# Patient Record
Sex: MALE | Race: WHITE | NOT HISPANIC OR LATINO | Employment: OTHER | URBAN - NONMETROPOLITAN AREA
[De-identification: names, ages, dates, MRNs, and addresses within clinical notes are randomized per-mention and may not be internally consistent; named-entity substitution may affect disease eponyms.]

---

## 2023-03-22 DIAGNOSIS — I10 ESSENTIAL (PRIMARY) HYPERTENSION: ICD-10-CM

## 2023-03-22 DIAGNOSIS — E78.00 PURE HYPERCHOLESTEROLEMIA, UNSPECIFIED: ICD-10-CM

## 2023-03-22 RX ORDER — SIMVASTATIN 40 MG/1
TABLET, FILM COATED ORAL
Qty: 90 TABLET | Refills: 1 | Status: SHIPPED | OUTPATIENT
Start: 2023-03-22

## 2023-03-22 RX ORDER — LISINOPRIL AND HYDROCHLOROTHIAZIDE 10; 12.5 MG/1; MG/1
TABLET ORAL
Qty: 90 TABLET | Refills: 1 | Status: SHIPPED | OUTPATIENT
Start: 2023-03-22 | End: 2023-08-02 | Stop reason: SDUPTHER

## 2023-04-04 PROBLEM — F33.1 MODERATE EPISODE OF RECURRENT MAJOR DEPRESSIVE DISORDER (MULTI): Status: ACTIVE | Noted: 2023-04-04

## 2023-04-04 PROBLEM — N52.9 MALE ERECTILE DISORDER OF ORGANIC ORIGIN: Status: ACTIVE | Noted: 2023-04-04

## 2023-04-04 PROBLEM — E66.01 CLASS 2 SEVERE OBESITY DUE TO EXCESS CALORIES WITH SERIOUS COMORBIDITY AND BODY MASS INDEX (BMI) OF 36.0 TO 36.9 IN ADULT (MULTI): Status: ACTIVE | Noted: 2023-04-04

## 2023-04-04 PROBLEM — I10 HYPERTENSION: Status: ACTIVE | Noted: 2023-04-04

## 2023-04-04 PROBLEM — N40.0 ENLARGED PROSTATE WITHOUT LOWER URINARY TRACT SYMPTOMS (LUTS): Status: ACTIVE | Noted: 2023-04-04

## 2023-04-04 PROBLEM — E66.812 CLASS 2 SEVERE OBESITY DUE TO EXCESS CALORIES WITH SERIOUS COMORBIDITY AND BODY MASS INDEX (BMI) OF 36.0 TO 36.9 IN ADULT: Status: ACTIVE | Noted: 2023-04-04

## 2023-04-04 PROBLEM — J01.80 OTHER ACUTE SINUSITIS: Status: ACTIVE | Noted: 2023-04-04

## 2023-04-04 PROBLEM — G47.33 OBSTRUCTIVE SLEEP APNEA: Status: ACTIVE | Noted: 2023-04-04

## 2023-04-04 PROBLEM — R53.83 FATIGUE: Status: ACTIVE | Noted: 2023-04-04

## 2023-04-04 PROBLEM — E78.00 HYPERCHOLESTEROLEMIA: Status: ACTIVE | Noted: 2023-04-04

## 2023-04-04 PROBLEM — G56.01 CARPAL TUNNEL SYNDROME OF RIGHT WRIST: Status: ACTIVE | Noted: 2023-04-04

## 2023-04-04 PROBLEM — R20.0 HAND NUMBNESS: Status: ACTIVE | Noted: 2023-04-04

## 2023-04-04 PROBLEM — M17.0 PRIMARY OSTEOARTHRITIS OF BOTH KNEES: Status: ACTIVE | Noted: 2023-04-04

## 2023-04-04 RX ORDER — MULTIVITAMIN
1 TABLET ORAL DAILY
COMMUNITY
End: 2023-04-05 | Stop reason: ALTCHOICE

## 2023-04-04 RX ORDER — IBUPROFEN 100 MG/5ML
1 SUSPENSION, ORAL (FINAL DOSE FORM) ORAL DAILY
COMMUNITY
End: 2023-04-05 | Stop reason: ALTCHOICE

## 2023-04-04 RX ORDER — FLUVOXAMINE MALEATE 100 MG/1
1 CAPSULE, EXTENDED RELEASE ORAL DAILY
COMMUNITY
Start: 2023-03-03 | End: 2023-04-05

## 2023-04-04 RX ORDER — SILDENAFIL 100 MG/1
1 TABLET, FILM COATED ORAL
COMMUNITY
Start: 2021-04-05 | End: 2023-05-03

## 2023-04-04 RX ORDER — ASPIRIN 81 MG/1
TABLET ORAL
COMMUNITY

## 2023-04-04 RX ORDER — HYDROXYZINE HYDROCHLORIDE 10 MG/1
1 TABLET, FILM COATED ORAL 4 TIMES DAILY PRN
COMMUNITY
End: 2023-04-24

## 2023-04-05 ENCOUNTER — OFFICE VISIT (OUTPATIENT)
Dept: PRIMARY CARE | Facility: CLINIC | Age: 73
End: 2023-04-05
Payer: MEDICARE

## 2023-04-05 VITALS
SYSTOLIC BLOOD PRESSURE: 142 MMHG | WEIGHT: 246 LBS | HEART RATE: 83 BPM | OXYGEN SATURATION: 97 % | HEIGHT: 71 IN | BODY MASS INDEX: 34.44 KG/M2 | DIASTOLIC BLOOD PRESSURE: 88 MMHG

## 2023-04-05 DIAGNOSIS — F33.1 MODERATE EPISODE OF RECURRENT MAJOR DEPRESSIVE DISORDER (MULTI): ICD-10-CM

## 2023-04-05 PROBLEM — E66.09 CLASS 1 OBESITY DUE TO EXCESS CALORIES WITHOUT SERIOUS COMORBIDITY WITH BODY MASS INDEX (BMI) OF 34.0 TO 34.9 IN ADULT: Status: ACTIVE | Noted: 2023-04-04

## 2023-04-05 PROBLEM — E66.811 CLASS 1 OBESITY DUE TO EXCESS CALORIES WITHOUT SERIOUS COMORBIDITY WITH BODY MASS INDEX (BMI) OF 34.0 TO 34.9 IN ADULT: Status: ACTIVE | Noted: 2023-04-04

## 2023-04-05 PROBLEM — E66.01 CLASS 2 SEVERE OBESITY DUE TO EXCESS CALORIES WITH SERIOUS COMORBIDITY AND BODY MASS INDEX (BMI) OF 36.0 TO 36.9 IN ADULT (MULTI): Status: RESOLVED | Noted: 2023-04-04 | Resolved: 2023-04-05

## 2023-04-05 PROBLEM — E66.812 CLASS 2 SEVERE OBESITY DUE TO EXCESS CALORIES WITH SERIOUS COMORBIDITY AND BODY MASS INDEX (BMI) OF 36.0 TO 36.9 IN ADULT: Status: RESOLVED | Noted: 2023-04-04 | Resolved: 2023-04-05

## 2023-04-05 PROCEDURE — 99213 OFFICE O/P EST LOW 20 MIN: CPT | Performed by: FAMILY MEDICINE

## 2023-04-05 PROCEDURE — 3077F SYST BP >= 140 MM HG: CPT | Performed by: FAMILY MEDICINE

## 2023-04-05 PROCEDURE — 3079F DIAST BP 80-89 MM HG: CPT | Performed by: FAMILY MEDICINE

## 2023-04-05 PROCEDURE — 1036F TOBACCO NON-USER: CPT | Performed by: FAMILY MEDICINE

## 2023-04-05 PROCEDURE — 1159F MED LIST DOCD IN RCRD: CPT | Performed by: FAMILY MEDICINE

## 2023-04-05 PROCEDURE — 1160F RVW MEDS BY RX/DR IN RCRD: CPT | Performed by: FAMILY MEDICINE

## 2023-04-05 RX ORDER — VENLAFAXINE HYDROCHLORIDE 75 MG/1
75 CAPSULE, EXTENDED RELEASE ORAL DAILY
Qty: 30 CAPSULE | Refills: 2 | Status: SHIPPED | OUTPATIENT
Start: 2023-04-05 | End: 2023-04-10

## 2023-04-05 ASSESSMENT — ENCOUNTER SYMPTOMS
OCCASIONAL FEELINGS OF UNSTEADINESS: 0
LOSS OF SENSATION IN FEET: 0
DEPRESSION: 0

## 2023-04-05 NOTE — PROGRESS NOTES
Subjective   Patient ID: Gerardo Santiago is a 73 y.o. male who presents for Follow-up (Follow up ).  HPI  Mood is not doing well still  Has not sleeping in bed- feels boxed in  Pace around the house as much  Not eating well  Everything bothers him  Does not do much of anything  Some feels of hopelessness and worthless  + cries and sadness  + guilt feeling  Concentration poor  No HI  Some SI- but will not do anything because of kids and grandkids    Still having ED    Current Outpatient Medications:     aspirin 81 mg EC tablet, Take by mouth., Disp: , Rfl:     hydrOXYzine HCL (Atarax) 10 mg tablet, Take 1 tablet (10 mg) by mouth 4 times a day as needed., Disp: , Rfl:     lisinopriL-hydrochlorothiazide 10-12.5 mg tablet, TAKE 1 TABLET BY MOUTH EVERY DAY, Disp: 90 tablet, Rfl: 1    sildenafil (Viagra) 100 mg tablet, Take 1 tablet (100 mg) by mouth. DAILY 1 HOUR BEFORE NEEDED, Disp: , Rfl:     simvastatin (Zocor) 40 mg tablet, TAKE 1 TABLET BY MOUTH EVERY DAY, Disp: 90 tablet, Rfl: 1    venlafaxine XR (Effexor-XR) 75 mg 24 hr capsule, Take 1 capsule (75 mg) by mouth once daily. Take with food., Disp: 30 capsule, Rfl: 2   Past Surgical History:   Procedure Laterality Date    TONSILLECTOMY  2014    Tonsillectomy With Adenoidectomy      Past Medical History:   Diagnosis Date    Essential (primary) hypertension 10/10/2022    Hypertension    Hypersomnia, unspecified 2014    Hypersomnia, organic    Personal history of other specified conditions 2016    History of fatigue    Pure hypercholesterolemia, unspecified 10/10/2022    Hypercholesterolemia     Social History     Tobacco Use    Smoking status: Former     Types: Cigarettes     Quit date:      Years since quittin.2    Smokeless tobacco: Never   Substance Use Topics    Alcohol use: Yes     Alcohol/week: 12.0 standard drinks of alcohol     Types: 12 Cans of beer per week    Drug use: Never      No family history on file.   Review of  "Systems    Objective   /88 Comment: Did not take meds today  Pulse 83   Ht 1.803 m (5' 11\")   Wt 112 kg (246 lb)   SpO2 97%   BMI 34.31 kg/m²    Physical Exam  Vitals and nursing note reviewed.   Constitutional:       Appearance: Normal appearance.   HENT:      Head: Normocephalic and atraumatic.   Cardiovascular:      Rate and Rhythm: Normal rate and regular rhythm.      Pulses: Normal pulses.      Heart sounds: Normal heart sounds.   Pulmonary:      Effort: Pulmonary effort is normal.      Breath sounds: Normal breath sounds.   Musculoskeletal:      Cervical back: Normal range of motion and neck supple.   Skin:     Capillary Refill: Capillary refill takes less than 2 seconds.   Neurological:      General: No focal deficit present.      Mental Status: He is alert and oriented to person, place, and time.   Psychiatric:         Mood and Affect: Mood is depressed. Affect is flat and tearful.         Behavior: Behavior normal.         Assessment/Plan   Problem List Items Addressed This Visit       Moderate episode of recurrent major depressive disorder (CMS/HCC)    Relevant Medications    venlafaxine XR (Effexor-XR) 75 mg 24 hr capsule   CV exercise  Avoid alcohol  discontinue fluvoxamine  F/U 1 month    Patient understands and agrees with treatment plan    Stefan Kaur, DO   "

## 2023-04-10 ENCOUNTER — TELEPHONE (OUTPATIENT)
Dept: PRIMARY CARE | Facility: CLINIC | Age: 73
End: 2023-04-10
Payer: MEDICARE

## 2023-04-10 DIAGNOSIS — F33.1 MODERATE EPISODE OF RECURRENT MAJOR DEPRESSIVE DISORDER (MULTI): Primary | ICD-10-CM

## 2023-04-10 RX ORDER — VENLAFAXINE HYDROCHLORIDE 150 MG/1
150 CAPSULE, EXTENDED RELEASE ORAL DAILY
Qty: 30 CAPSULE | Refills: 1 | Status: SHIPPED | OUTPATIENT
Start: 2023-04-10 | End: 2023-05-02

## 2023-04-10 NOTE — PROGRESS NOTES
Subjective   Patient ID: Gerardo Santiago is a 73 y.o. male who presents for No chief complaint on file..  HPI    Review of Systems    Objective   Physical Exam    Assessment/Plan

## 2023-04-24 DIAGNOSIS — Z00.00 ENCOUNTER FOR GENERAL ADULT MEDICAL EXAMINATION WITHOUT ABNORMAL FINDINGS: ICD-10-CM

## 2023-04-24 RX ORDER — HYDROXYZINE HYDROCHLORIDE 10 MG/1
TABLET, FILM COATED ORAL
Qty: 40 TABLET | Refills: 1 | Status: SHIPPED | OUTPATIENT
Start: 2023-04-24

## 2023-04-26 DIAGNOSIS — F33.1 MODERATE EPISODE OF RECURRENT MAJOR DEPRESSIVE DISORDER (MULTI): Primary | ICD-10-CM

## 2023-04-26 RX ORDER — BUPROPION HYDROCHLORIDE 300 MG/1
300 TABLET ORAL EVERY MORNING
Qty: 30 TABLET | Refills: 5 | Status: SHIPPED | OUTPATIENT
Start: 2023-04-26 | End: 2023-05-18

## 2023-04-26 RX ORDER — VENLAFAXINE HYDROCHLORIDE 75 MG/1
75 CAPSULE, EXTENDED RELEASE ORAL DAILY
Qty: 30 CAPSULE | Refills: 1 | Status: SHIPPED | OUTPATIENT
Start: 2023-04-26 | End: 2023-05-18

## 2023-05-02 DIAGNOSIS — F33.1 MODERATE EPISODE OF RECURRENT MAJOR DEPRESSIVE DISORDER (MULTI): ICD-10-CM

## 2023-05-02 RX ORDER — VENLAFAXINE HYDROCHLORIDE 150 MG/1
150 CAPSULE, EXTENDED RELEASE ORAL DAILY
Qty: 30 CAPSULE | Refills: 1 | Status: SHIPPED | OUTPATIENT
Start: 2023-05-02 | End: 2023-05-30

## 2023-05-03 ENCOUNTER — OFFICE VISIT (OUTPATIENT)
Dept: PRIMARY CARE | Facility: CLINIC | Age: 73
End: 2023-05-03
Payer: MEDICARE

## 2023-05-03 VITALS
OXYGEN SATURATION: 98 % | SYSTOLIC BLOOD PRESSURE: 142 MMHG | DIASTOLIC BLOOD PRESSURE: 90 MMHG | HEIGHT: 71 IN | BODY MASS INDEX: 33.6 KG/M2 | WEIGHT: 240 LBS | HEART RATE: 92 BPM

## 2023-05-03 DIAGNOSIS — N52.9 MALE ERECTILE DISORDER OF ORGANIC ORIGIN: ICD-10-CM

## 2023-05-03 DIAGNOSIS — F33.1 MODERATE EPISODE OF RECURRENT MAJOR DEPRESSIVE DISORDER (MULTI): Primary | ICD-10-CM

## 2023-05-03 PROCEDURE — 1159F MED LIST DOCD IN RCRD: CPT | Performed by: FAMILY MEDICINE

## 2023-05-03 PROCEDURE — 1160F RVW MEDS BY RX/DR IN RCRD: CPT | Performed by: FAMILY MEDICINE

## 2023-05-03 PROCEDURE — 3077F SYST BP >= 140 MM HG: CPT | Performed by: FAMILY MEDICINE

## 2023-05-03 PROCEDURE — 1036F TOBACCO NON-USER: CPT | Performed by: FAMILY MEDICINE

## 2023-05-03 PROCEDURE — 99213 OFFICE O/P EST LOW 20 MIN: CPT | Performed by: FAMILY MEDICINE

## 2023-05-03 PROCEDURE — 3080F DIAST BP >= 90 MM HG: CPT | Performed by: FAMILY MEDICINE

## 2023-05-03 RX ORDER — VARDENAFIL HYDROCHLORIDE 20 MG/1
20 TABLET ORAL DAILY PRN
Qty: 6 TABLET | Refills: 11 | Status: SHIPPED | OUTPATIENT
Start: 2023-05-03 | End: 2024-03-29

## 2023-05-03 NOTE — PROGRESS NOTES
Subjective   Patient ID: Gerardo Santiago is a 73 y.o. male who presents for Follow-up (Follow upon medications ).  HPI  Mood is doing a lot better  Sleeping better  Not pacing around the house anymore  Appetite is okay  Still a few things bother him  Starting to do things  Not as much hopelessness and worthless  No crying in several days  No guilty feelings  Concentration good  No HI/SI      Current Outpatient Medications:     aspirin 81 mg EC tablet, Take by mouth., Disp: , Rfl:     buPROPion XL (Wellbutrin XL) 300 mg 24 hr tablet, Take 1 tablet (300 mg) by mouth once daily in the morning. Do not crush, chew, or split., Disp: 30 tablet, Rfl: 5    hydrOXYzine HCL (Atarax) 10 mg tablet, TAKE 1 TABLET BY MOUTH FOUR TIMES A DAY AS NEEDED, Disp: 40 tablet, Rfl: 1    lisinopriL-hydrochlorothiazide 10-12.5 mg tablet, TAKE 1 TABLET BY MOUTH EVERY DAY, Disp: 90 tablet, Rfl: 1    simvastatin (Zocor) 40 mg tablet, TAKE 1 TABLET BY MOUTH EVERY DAY, Disp: 90 tablet, Rfl: 1    vardenafil (Levitra) 20 mg tablet, Take 1 tablet (20 mg) by mouth once daily as needed for erectile dysfunction., Disp: 6 tablet, Rfl: 11    venlafaxine XR (Effexor-XR) 150 mg 24 hr capsule, TAKE 1 CAPSULE (150 MG) BY MOUTH ONCE DAILY. TAKE WITH FOOD., Disp: 30 capsule, Rfl: 1    venlafaxine XR (Effexor-XR) 75 mg 24 hr capsule, Take 1 capsule (75 mg) by mouth once daily. Take with food., Disp: 30 capsule, Rfl: 1   Past Surgical History:   Procedure Laterality Date    TONSILLECTOMY  11/06/2014    Tonsillectomy With Adenoidectomy      Past Medical History:   Diagnosis Date    Essential (primary) hypertension 10/10/2022    Hypertension    Hypersomnia, unspecified 11/06/2014    Hypersomnia, organic    Personal history of other specified conditions 08/05/2016    History of fatigue    Pure hypercholesterolemia, unspecified 10/10/2022    Hypercholesterolemia     Social History     Tobacco Use    Smoking status: Former     Types: Cigarettes     Quit date: 1988      "Years since quittin.3    Smokeless tobacco: Never   Substance Use Topics    Alcohol use: Yes     Alcohol/week: 12.0 standard drinks of alcohol     Types: 12 Cans of beer per week    Drug use: Never      No family history on file.   Review of Systems    Objective   /90   Pulse 92   Ht 1.803 m (5' 11\")   Wt 109 kg (240 lb)   SpO2 98%   BMI 33.47 kg/m²    Physical Exam  Vitals and nursing note reviewed.   Constitutional:       Appearance: Normal appearance.   HENT:      Head: Normocephalic and atraumatic.   Cardiovascular:      Rate and Rhythm: Normal rate and regular rhythm.      Pulses: Normal pulses.      Heart sounds: Normal heart sounds.   Pulmonary:      Effort: Pulmonary effort is normal.      Breath sounds: Normal breath sounds.   Musculoskeletal:      Cervical back: Normal range of motion and neck supple.   Skin:     Capillary Refill: Capillary refill takes less than 2 seconds.   Neurological:      Mental Status: He is alert.   Psychiatric:         Mood and Affect: Mood normal.         Behavior: Behavior normal.         Assessment/Plan   Problem List Items Addressed This Visit       Male erectile disorder of organic origin    Relevant Medications    vardenafil (Levitra) 20 mg tablet    Moderate episode of recurrent major depressive disorder (CMS/HCC) - Primary   Continue Venlafaxine 225 mg and welbutrin 300 mg as mood better  CV exercise    Patient understands and agrees with treatment plan    Stefan Kaur, DO   "

## 2023-05-18 DIAGNOSIS — F33.1 MODERATE EPISODE OF RECURRENT MAJOR DEPRESSIVE DISORDER (MULTI): ICD-10-CM

## 2023-05-18 DIAGNOSIS — N52.9 MALE ERECTILE DISORDER OF ORGANIC ORIGIN: Primary | ICD-10-CM

## 2023-05-18 RX ORDER — BUPROPION HYDROCHLORIDE 300 MG/1
300 TABLET ORAL EVERY MORNING
Qty: 90 TABLET | Refills: 1 | Status: SHIPPED | OUTPATIENT
Start: 2023-05-18 | End: 2023-12-05

## 2023-05-18 RX ORDER — VENLAFAXINE HYDROCHLORIDE 75 MG/1
75 CAPSULE, EXTENDED RELEASE ORAL DAILY
Qty: 90 CAPSULE | Refills: 1 | Status: SHIPPED | OUTPATIENT
Start: 2023-05-18 | End: 2023-08-02 | Stop reason: SDUPTHER

## 2023-05-28 DIAGNOSIS — F33.1 MODERATE EPISODE OF RECURRENT MAJOR DEPRESSIVE DISORDER (MULTI): ICD-10-CM

## 2023-05-30 RX ORDER — VENLAFAXINE HYDROCHLORIDE 150 MG/1
150 CAPSULE, EXTENDED RELEASE ORAL DAILY
Qty: 90 CAPSULE | Refills: 1 | Status: SHIPPED | OUTPATIENT
Start: 2023-05-30 | End: 2023-12-05

## 2023-08-02 ENCOUNTER — OFFICE VISIT (OUTPATIENT)
Dept: PRIMARY CARE | Facility: CLINIC | Age: 73
End: 2023-08-02
Payer: MEDICARE

## 2023-08-02 VITALS
WEIGHT: 232 LBS | DIASTOLIC BLOOD PRESSURE: 80 MMHG | HEIGHT: 71 IN | BODY MASS INDEX: 32.48 KG/M2 | HEART RATE: 86 BPM | OXYGEN SATURATION: 97 % | SYSTOLIC BLOOD PRESSURE: 132 MMHG

## 2023-08-02 DIAGNOSIS — Z12.5 SCREENING FOR MALIGNANT NEOPLASM OF PROSTATE: ICD-10-CM

## 2023-08-02 DIAGNOSIS — Z13.89 ENCOUNTER FOR SCREENING FOR OTHER DISORDER: ICD-10-CM

## 2023-08-02 DIAGNOSIS — E66.09 CLASS 1 OBESITY DUE TO EXCESS CALORIES WITHOUT SERIOUS COMORBIDITY WITH BODY MASS INDEX (BMI) OF 32.0 TO 32.9 IN ADULT: ICD-10-CM

## 2023-08-02 DIAGNOSIS — F33.1 MODERATE EPISODE OF RECURRENT MAJOR DEPRESSIVE DISORDER (MULTI): ICD-10-CM

## 2023-08-02 DIAGNOSIS — F10.20 ALCOHOL DEPENDENCE, BINGE PATTERN (MULTI): ICD-10-CM

## 2023-08-02 DIAGNOSIS — E78.00 HYPERCHOLESTEROLEMIA: ICD-10-CM

## 2023-08-02 DIAGNOSIS — I10 ESSENTIAL (PRIMARY) HYPERTENSION: ICD-10-CM

## 2023-08-02 DIAGNOSIS — I10 PRIMARY HYPERTENSION: Primary | ICD-10-CM

## 2023-08-02 DIAGNOSIS — N52.9 MALE ERECTILE DISORDER OF ORGANIC ORIGIN: ICD-10-CM

## 2023-08-02 PROBLEM — J01.80 OTHER ACUTE SINUSITIS: Status: RESOLVED | Noted: 2023-04-04 | Resolved: 2023-08-02

## 2023-08-02 LAB
ALANINE AMINOTRANSFERASE (SGPT) (U/L) IN SER/PLAS: 13 U/L (ref 10–52)
ALBUMIN (G/DL) IN SER/PLAS: 4.1 G/DL (ref 3.4–5)
ALKALINE PHOSPHATASE (U/L) IN SER/PLAS: 56 U/L (ref 33–136)
ANION GAP IN SER/PLAS: 16 MMOL/L (ref 10–20)
ASPARTATE AMINOTRANSFERASE (SGOT) (U/L) IN SER/PLAS: 12 U/L (ref 9–39)
BILIRUBIN TOTAL (MG/DL) IN SER/PLAS: 0.7 MG/DL (ref 0–1.2)
CALCIUM (MG/DL) IN SER/PLAS: 9.1 MG/DL (ref 8.6–10.3)
CARBON DIOXIDE, TOTAL (MMOL/L) IN SER/PLAS: 23 MMOL/L (ref 21–32)
CHLORIDE (MMOL/L) IN SER/PLAS: 102 MMOL/L (ref 98–107)
CHOLESTEROL (MG/DL) IN SER/PLAS: 159 MG/DL (ref 0–199)
CHOLESTEROL IN HDL (MG/DL) IN SER/PLAS: 44.7 MG/DL
CHOLESTEROL/HDL RATIO: 3.6
CREATININE (MG/DL) IN SER/PLAS: 0.98 MG/DL (ref 0.5–1.3)
GFR MALE: 82 ML/MIN/1.73M2
GLUCOSE (MG/DL) IN SER/PLAS: 99 MG/DL (ref 74–99)
LDL: 88 MG/DL (ref 0–99)
POTASSIUM (MMOL/L) IN SER/PLAS: 3.7 MMOL/L (ref 3.5–5.3)
PROTEIN TOTAL: 6 G/DL (ref 6.4–8.2)
SODIUM (MMOL/L) IN SER/PLAS: 137 MMOL/L (ref 136–145)
TRIGLYCERIDE (MG/DL) IN SER/PLAS: 132 MG/DL (ref 0–149)
UREA NITROGEN (MG/DL) IN SER/PLAS: 13 MG/DL (ref 6–23)
VLDL: 26 MG/DL (ref 0–40)

## 2023-08-02 PROCEDURE — 99214 OFFICE O/P EST MOD 30 MIN: CPT | Performed by: FAMILY MEDICINE

## 2023-08-02 PROCEDURE — 1036F TOBACCO NON-USER: CPT | Performed by: FAMILY MEDICINE

## 2023-08-02 PROCEDURE — 1159F MED LIST DOCD IN RCRD: CPT | Performed by: FAMILY MEDICINE

## 2023-08-02 PROCEDURE — 1160F RVW MEDS BY RX/DR IN RCRD: CPT | Performed by: FAMILY MEDICINE

## 2023-08-02 PROCEDURE — 3075F SYST BP GE 130 - 139MM HG: CPT | Performed by: FAMILY MEDICINE

## 2023-08-02 PROCEDURE — 3008F BODY MASS INDEX DOCD: CPT | Performed by: FAMILY MEDICINE

## 2023-08-02 PROCEDURE — 80053 COMPREHEN METABOLIC PANEL: CPT

## 2023-08-02 PROCEDURE — G0442 ANNUAL ALCOHOL SCREEN 15 MIN: HCPCS | Performed by: FAMILY MEDICINE

## 2023-08-02 PROCEDURE — G0103 PSA SCREENING: HCPCS

## 2023-08-02 PROCEDURE — 80061 LIPID PANEL: CPT

## 2023-08-02 PROCEDURE — 3079F DIAST BP 80-89 MM HG: CPT | Performed by: FAMILY MEDICINE

## 2023-08-02 RX ORDER — FLUVOXAMINE MALEATE 50 MG/1
50 TABLET, FILM COATED ORAL
COMMUNITY
End: 2023-08-02 | Stop reason: ALTCHOICE

## 2023-08-02 RX ORDER — VENLAFAXINE HYDROCHLORIDE 75 MG/1
75 CAPSULE, EXTENDED RELEASE ORAL DAILY
Qty: 90 CAPSULE | Refills: 1 | Status: SHIPPED | OUTPATIENT
Start: 2023-08-02 | End: 2024-03-29

## 2023-08-02 RX ORDER — LISINOPRIL AND HYDROCHLOROTHIAZIDE 10; 12.5 MG/1; MG/1
1 TABLET ORAL DAILY
Qty: 90 TABLET | Refills: 1 | Status: SHIPPED | OUTPATIENT
Start: 2023-08-02 | End: 2024-03-29

## 2023-08-02 ASSESSMENT — LIFESTYLE VARIABLES
HOW MANY STANDARD DRINKS CONTAINING ALCOHOL DO YOU HAVE ON A TYPICAL DAY: 1 OR 2
SKIP TO QUESTIONS 9-10: 0
AUDIT-C TOTAL SCORE: 6
HOW OFTEN DURING THE LAST YEAR HAVE YOU FOUND THAT YOU WERE NOT ABLE TO STOP DRINKING ONCE YOU HAD STARTED: NEVER
AUDIT-C TOTAL SCORE: 6
AUDIT TOTAL SCORE: 14
HAVE YOU OR SOMEONE ELSE BEEN INJURED AS A RESULT OF YOUR DRINKING: YES, DURING THE LAST YEAR
HAS A RELATIVE, FRIEND, DOCTOR, OR ANOTHER HEALTH PROFESSIONAL EXPRESSED CONCERN ABOUT YOUR DRINKING OR SUGGESTED YOU CUT DOWN: YES, BUT NOT IN THE LAST YEAR
HOW OFTEN DURING THE LAST YEAR HAVE YOU FAILED TO DO WHAT WAS NORMALLY EXPECTED FROM YOU BECAUSE OF DRINKING: LESS THAN MONTHLY
HOW OFTEN DO YOU HAVE SIX OR MORE DRINKS ON ONE OCCASION: WEEKLY
HOW OFTEN DURING THE LAST YEAR HAVE YOU BEEN UNABLE TO REMEMBER WHAT HAPPENED THE NIGHT BEFORE BECAUSE YOU HAD BEEN DRINKING: NEVER
HOW OFTEN DURING THE LAST YEAR HAVE YOU NEEDED AN ALCOHOLIC DRINK FIRST THING IN THE MORNING TO GET YOURSELF GOING AFTER A NIGHT OF HEAVY DRINKING: NEVER
HOW OFTEN DURING THE LAST YEAR HAVE YOU HAD A FEELING OF GUILT OR REMORSE AFTER DRINKING: LESS THAN MONTHLY
HOW OFTEN DO YOU HAVE A DRINK CONTAINING ALCOHOL: 2-3 TIMES A WEEK

## 2023-08-02 NOTE — PROGRESS NOTES
Subjective   Patient ID: Gerardo Santiago is a 73 y.o. male who presents for Hypertension (Hypertension follow up ).  HPI  No SE meds  Mood is doing okay  Occasional hopeless  Feels down at times  Some anger issues     No CP, SOB, palpitations, numbness, weakness, dizziness, HA, vision changes     Ophtho- 4/21  Dentist- every 6 months  Colonoscopy- 4/22- follow up 5 years  ISABEL- refused  PSA- 4/21  UA/Micro- 3/23  Lung CT- (less then 20 pack year history)  AAA- 5/21  EKG-  Pneumovax- 10/19  Prevnar- 5/17  Flu- refused  Zoster- 4/19  Td- 2011  Hep C- 10/18  Advance Directives-   ETOH screen- 8/2/23  CV risk-     Current Outpatient Medications:     aspirin 81 mg EC tablet, Take by mouth., Disp: , Rfl:     buPROPion XL (Wellbutrin XL) 300 mg 24 hr tablet, Take 1 tablet (300 mg) by mouth once daily in the morning. Do not crush, chew, or split., Disp: 90 tablet, Rfl: 1    hydrOXYzine HCL (Atarax) 10 mg tablet, TAKE 1 TABLET BY MOUTH FOUR TIMES A DAY AS NEEDED, Disp: 40 tablet, Rfl: 1    lisinopriL-hydrochlorothiazide 10-12.5 mg tablet, Take 1 tablet by mouth once daily., Disp: 90 tablet, Rfl: 1    simvastatin (Zocor) 40 mg tablet, TAKE 1 TABLET BY MOUTH EVERY DAY, Disp: 90 tablet, Rfl: 1    vardenafil (Levitra) 20 mg tablet, Take 1 tablet (20 mg) by mouth once daily as needed for erectile dysfunction., Disp: 6 tablet, Rfl: 11    venlafaxine XR (Effexor-XR) 150 mg 24 hr capsule, Take 1 capsule (150 mg) by mouth once daily. Take with food., Disp: 90 capsule, Rfl: 1    venlafaxine XR (Effexor-XR) 75 mg 24 hr capsule, Take 1 capsule (75 mg) by mouth once daily. Take with food., Disp: 90 capsule, Rfl: 1   Past Surgical History:   Procedure Laterality Date    TONSILLECTOMY  11/06/2014    Tonsillectomy With Adenoidectomy      Past Medical History:   Diagnosis Date    Essential (primary) hypertension 10/10/2022    Hypertension    Hypersomnia, unspecified 11/06/2014    Hypersomnia, organic    Other acute sinusitis 04/04/2023     "Personal history of other specified conditions 2016    History of fatigue    Pure hypercholesterolemia, unspecified 10/10/2022    Hypercholesterolemia     Social History     Tobacco Use    Smoking status: Former     Types: Cigarettes     Quit date:      Years since quittin.6    Smokeless tobacco: Never   Substance Use Topics    Alcohol use: Yes     Alcohol/week: 12.0 standard drinks of alcohol     Types: 12 Cans of beer per week    Drug use: Never      No family history on file.   Review of Systems    Objective   /80   Pulse 86   Ht 1.803 m (5' 11\")   Wt 105 kg (232 lb)   SpO2 97%   BMI 32.36 kg/m²    Physical Exam  Vitals and nursing note reviewed.   Constitutional:       Appearance: Normal appearance.   HENT:      Head: Normocephalic and atraumatic.   Cardiovascular:      Rate and Rhythm: Normal rate and regular rhythm.      Pulses: Normal pulses.      Heart sounds: Normal heart sounds.   Pulmonary:      Effort: Pulmonary effort is normal.      Breath sounds: Normal breath sounds.   Musculoskeletal:      Cervical back: Normal range of motion and neck supple.   Skin:     Capillary Refill: Capillary refill takes less than 2 seconds.   Neurological:      General: No focal deficit present.      Mental Status: He is alert and oriented to person, place, and time.   Psychiatric:         Mood and Affect: Mood is depressed.         Behavior: Behavior normal.         Assessment/Plan   Problem List Items Addressed This Visit       Hypercholesterolemia    Relevant Orders    Comprehensive Metabolic Panel (Completed)    Lipid Panel (Completed)    Primary hypertension - Primary    Relevant Orders    Comprehensive Metabolic Panel (Completed)    Male erectile disorder of organic origin    Moderate episode of recurrent major depressive disorder (CMS/HCC)    Relevant Medications    venlafaxine XR (Effexor-XR) 75 mg 24 hr capsule    Class 1 obesity due to excess calories without serious comorbidity with body " mass index (BMI) of 32.0 to 32.9 in adult     Other Visit Diagnoses       Alcohol dependence, binge pattern (CMS/HCC)        Screening for malignant neoplasm of prostate        Relevant Orders    Prostate Specific Antigen, Screen    Essential (primary) hypertension        Relevant Medications    lisinopriL-hydrochlorothiazide 10-12.5 mg tablet    Encounter for screening for other disorder            Renewed/continued rest of medications  Check labs     HTN, controlled- continue meds, low sodium diet     Obesity- caloric restriction, increase CV exercise     Hyperlipidemia- continue meds, low fat/cholesterol diet     MDD- Venlafaxine, bupropion, CV exercise, to counseling     KELLY- F/U sleep medicine     ED- see urology, control BP and cholesterol     BPH- avoid caffeine, avoid fluids before bed    Alcohol Overuse- cut down to 1-2 a day       Patient understands and agrees with treatment plan    Stefan Kaur, DO

## 2023-08-02 NOTE — PATIENT INSTRUCTIONS
Counseling Services         Suicide Hotline: [phone] [phone]         [facility]    [phone]    [address] [place] [address]    [email or web address]         [facility]      [phone]    [address] [place]   [address]    [employer]         [facility]    [phone] [address]    [place] [address]    [employer]         [facility]    [phone]    Variety of locations    [email or web address]             [phone] [address]    [place] [address]         [facility]    [phone]    Will see people at the [facility]    [phone]    [address]    [place] [address]         [facility]    General number: [phone]    [email or web address]    *no kids*         [facility]    [phone] [address] [place] [address]    *no kids*         [facility]    [phone] [address] [place] [address]    *no kids*         [facility]    [phone] [address]    [place] [address]         [facility]: [facility]    [phone]    [facility]    [address] [place], [address]    Walk in hours: Mon - Fri 1:00 PM - 5:00 PM         [facility]: [facility]    [phone] <[phone]>)    [address] [place], [place], [address]    Walk in hours: Mon - Fri  11:00 AM - 1:30 PM

## 2023-08-03 LAB — PROSTATE SPECIFIC ANTIGEN,SCREEN: 2.58 NG/ML (ref 0–4)

## 2023-08-08 DIAGNOSIS — N52.9 MALE ERECTILE DISORDER OF ORGANIC ORIGIN: Primary | ICD-10-CM

## 2023-08-08 RX ORDER — TADALAFIL 20 MG/1
20 TABLET ORAL DAILY PRN
Qty: 6 TABLET | Refills: 11 | Status: SHIPPED | OUTPATIENT
Start: 2023-08-08 | End: 2024-03-29 | Stop reason: SDUPTHER

## 2023-12-05 ENCOUNTER — APPOINTMENT (OUTPATIENT)
Dept: PRIMARY CARE | Facility: CLINIC | Age: 73
End: 2023-12-05
Payer: MEDICARE

## 2023-12-05 DIAGNOSIS — F33.1 MODERATE EPISODE OF RECURRENT MAJOR DEPRESSIVE DISORDER (MULTI): ICD-10-CM

## 2023-12-05 RX ORDER — VENLAFAXINE HYDROCHLORIDE 150 MG/1
150 CAPSULE, EXTENDED RELEASE ORAL DAILY
Qty: 90 CAPSULE | Refills: 1 | Status: SHIPPED | OUTPATIENT
Start: 2023-12-05 | End: 2024-06-07

## 2023-12-05 RX ORDER — BUPROPION HYDROCHLORIDE 300 MG/1
300 TABLET ORAL EVERY MORNING
Qty: 90 TABLET | Refills: 1 | Status: SHIPPED | OUTPATIENT
Start: 2023-12-05 | End: 2024-06-07

## 2024-02-07 ENCOUNTER — OFFICE VISIT (OUTPATIENT)
Dept: PRIMARY CARE | Facility: CLINIC | Age: 74
End: 2024-02-07
Payer: MEDICARE

## 2024-02-07 VITALS
SYSTOLIC BLOOD PRESSURE: 138 MMHG | DIASTOLIC BLOOD PRESSURE: 80 MMHG | HEART RATE: 82 BPM | WEIGHT: 221 LBS | BODY MASS INDEX: 30.94 KG/M2 | OXYGEN SATURATION: 98 % | HEIGHT: 71 IN

## 2024-02-07 DIAGNOSIS — G89.29 CHRONIC PAIN OF BOTH KNEES: ICD-10-CM

## 2024-02-07 DIAGNOSIS — G47.33 OBSTRUCTIVE SLEEP APNEA: ICD-10-CM

## 2024-02-07 DIAGNOSIS — M54.50 ACUTE BILATERAL LOW BACK PAIN WITHOUT SCIATICA: ICD-10-CM

## 2024-02-07 DIAGNOSIS — M25.562 CHRONIC PAIN OF BOTH KNEES: ICD-10-CM

## 2024-02-07 DIAGNOSIS — M25.561 CHRONIC PAIN OF BOTH KNEES: ICD-10-CM

## 2024-02-07 DIAGNOSIS — I10 PRIMARY HYPERTENSION: ICD-10-CM

## 2024-02-07 DIAGNOSIS — F33.1 MODERATE EPISODE OF RECURRENT MAJOR DEPRESSIVE DISORDER (MULTI): ICD-10-CM

## 2024-02-07 DIAGNOSIS — E78.00 HYPERCHOLESTEROLEMIA: ICD-10-CM

## 2024-02-07 DIAGNOSIS — Z00.00 ROUTINE GENERAL MEDICAL EXAMINATION AT HEALTH CARE FACILITY: Primary | ICD-10-CM

## 2024-02-07 DIAGNOSIS — E66.09 CLASS 1 OBESITY DUE TO EXCESS CALORIES WITHOUT SERIOUS COMORBIDITY WITH BODY MASS INDEX (BMI) OF 30.0 TO 30.9 IN ADULT: ICD-10-CM

## 2024-02-07 DIAGNOSIS — Z71.89 CARDIAC RISK COUNSELING: ICD-10-CM

## 2024-02-07 DIAGNOSIS — F10.20 ALCOHOL DEPENDENCE, BINGE PATTERN (MULTI): ICD-10-CM

## 2024-02-07 LAB
ALBUMIN SERPL BCP-MCNC: 4.3 G/DL (ref 3.4–5)
ALP SERPL-CCNC: 58 U/L (ref 33–136)
ALT SERPL W P-5'-P-CCNC: 13 U/L (ref 10–52)
ANION GAP SERPL CALC-SCNC: 12 MMOL/L (ref 10–20)
AST SERPL W P-5'-P-CCNC: 15 U/L (ref 9–39)
BILIRUB SERPL-MCNC: 0.5 MG/DL (ref 0–1.2)
BUN SERPL-MCNC: 12 MG/DL (ref 6–23)
CALCIUM SERPL-MCNC: 9.5 MG/DL (ref 8.6–10.3)
CHLORIDE SERPL-SCNC: 100 MMOL/L (ref 98–107)
CHOLEST SERPL-MCNC: 175 MG/DL (ref 0–199)
CHOLESTEROL/HDL RATIO: 3.3
CO2 SERPL-SCNC: 33 MMOL/L (ref 21–32)
CREAT SERPL-MCNC: 0.86 MG/DL (ref 0.5–1.3)
EGFRCR SERPLBLD CKD-EPI 2021: >90 ML/MIN/1.73M*2
GLUCOSE SERPL-MCNC: 98 MG/DL (ref 74–99)
HDLC SERPL-MCNC: 53.2 MG/DL
LDLC SERPL CALC-MCNC: 110 MG/DL
NON HDL CHOLESTEROL: 122 MG/DL (ref 0–149)
POTASSIUM SERPL-SCNC: 4.5 MMOL/L (ref 3.5–5.3)
PROT SERPL-MCNC: 6.6 G/DL (ref 6.4–8.2)
SODIUM SERPL-SCNC: 140 MMOL/L (ref 136–145)
TRIGL SERPL-MCNC: 60 MG/DL (ref 0–149)
VLDL: 12 MG/DL (ref 0–40)

## 2024-02-07 PROCEDURE — 80061 LIPID PANEL: CPT

## 2024-02-07 PROCEDURE — G0446 INTENS BEHAVE THER CARDIO DX: HCPCS | Performed by: FAMILY MEDICINE

## 2024-02-07 PROCEDURE — 3075F SYST BP GE 130 - 139MM HG: CPT | Performed by: FAMILY MEDICINE

## 2024-02-07 PROCEDURE — 80053 COMPREHEN METABOLIC PANEL: CPT

## 2024-02-07 PROCEDURE — 1160F RVW MEDS BY RX/DR IN RCRD: CPT | Performed by: FAMILY MEDICINE

## 2024-02-07 PROCEDURE — 36415 COLL VENOUS BLD VENIPUNCTURE: CPT

## 2024-02-07 PROCEDURE — 99397 PER PM REEVAL EST PAT 65+ YR: CPT | Performed by: FAMILY MEDICINE

## 2024-02-07 PROCEDURE — 99214 OFFICE O/P EST MOD 30 MIN: CPT | Performed by: FAMILY MEDICINE

## 2024-02-07 PROCEDURE — G0439 PPPS, SUBSEQ VISIT: HCPCS | Performed by: FAMILY MEDICINE

## 2024-02-07 PROCEDURE — 1123F ACP DISCUSS/DSCN MKR DOCD: CPT | Performed by: FAMILY MEDICINE

## 2024-02-07 PROCEDURE — 1158F ADVNC CARE PLAN TLK DOCD: CPT | Performed by: FAMILY MEDICINE

## 2024-02-07 PROCEDURE — 1170F FXNL STATUS ASSESSED: CPT | Performed by: FAMILY MEDICINE

## 2024-02-07 PROCEDURE — 1159F MED LIST DOCD IN RCRD: CPT | Performed by: FAMILY MEDICINE

## 2024-02-07 PROCEDURE — 3008F BODY MASS INDEX DOCD: CPT | Performed by: FAMILY MEDICINE

## 2024-02-07 PROCEDURE — 1036F TOBACCO NON-USER: CPT | Performed by: FAMILY MEDICINE

## 2024-02-07 PROCEDURE — 3079F DIAST BP 80-89 MM HG: CPT | Performed by: FAMILY MEDICINE

## 2024-02-07 ASSESSMENT — ENCOUNTER SYMPTOMS
NUMBNESS: 1
SORE THROAT: 0
OCCASIONAL FEELINGS OF UNSTEADINESS: 0
EYE REDNESS: 0
TREMORS: 0
BRUISES/BLEEDS EASILY: 0
ARTHRALGIAS: 1
CONSTIPATION: 0
DYSURIA: 0
NERVOUS/ANXIOUS: 1
WHEEZING: 0
SHORTNESS OF BREATH: 0
ADENOPATHY: 0
PALPITATIONS: 0
FATIGUE: 0
DYSPHORIC MOOD: 1
POLYDIPSIA: 0
DEPRESSION: 0
ABDOMINAL PAIN: 0
HEADACHES: 0
TROUBLE SWALLOWING: 0
WEAKNESS: 0
BLOOD IN STOOL: 0
HEMATURIA: 0
POLYPHAGIA: 0
DIZZINESS: 0
LOSS OF SENSATION IN FEET: 0
COUGH: 0
EYE PAIN: 0
DIARRHEA: 0
VOMITING: 0
COLOR CHANGE: 0
FEVER: 0
CHEST TIGHTNESS: 0
NAUSEA: 0
BACK PAIN: 0
FREQUENCY: 0
CHILLS: 0

## 2024-02-07 ASSESSMENT — ACTIVITIES OF DAILY LIVING (ADL)
GROCERY_SHOPPING: INDEPENDENT
DRESSING: INDEPENDENT
TAKING_MEDICATION: INDEPENDENT
MANAGING_FINANCES: INDEPENDENT
DOING_HOUSEWORK: INDEPENDENT
BATHING: INDEPENDENT

## 2024-02-07 ASSESSMENT — PATIENT HEALTH QUESTIONNAIRE - PHQ9
2. FEELING DOWN, DEPRESSED OR HOPELESS: SEVERAL DAYS
1. LITTLE INTEREST OR PLEASURE IN DOING THINGS: SEVERAL DAYS
SUM OF ALL RESPONSES TO PHQ9 QUESTIONS 1 AND 2: 2
10. IF YOU CHECKED OFF ANY PROBLEMS, HOW DIFFICULT HAVE THESE PROBLEMS MADE IT FOR YOU TO DO YOUR WORK, TAKE CARE OF THINGS AT HOME, OR GET ALONG WITH OTHER PEOPLE: SOMEWHAT DIFFICULT

## 2024-02-07 NOTE — PROGRESS NOTES
Subjective   Reason for Visit: Gerardo Santiago is an 73 y.o. male here for a Medicare Wellness visit.     Past Medical, Surgical, and Family History reviewed and updated in chart.    Reviewed all medications by prescribing practitioner or clinical pharmacist (such as prescriptions, OTCs, herbal therapies and supplements) and documented in the medical record.    HPI  No SE meds  Mood is doing okay  Occasional hopeless  Feels down at times  Some anger issues at times     No CP, SOB, palpitations, numbness, weakness, dizziness, HA, vision changes    Drinking a few times a week- a few beers    Has pain in low back off and on  May get a little numbness in buttocks  Achy pain  More standing, walking     Ophtho- 4/21  Dentist- every 6 months  Colonoscopy- 4/22- follow up 5 years  ISABEL- refused  PSA- 8/23  UA/Micro- 3/23  Lung CT- (less then 20 pack year history)  AAA- 5/21  EKG-  Pneumovax- 10/19  Prevnar- 5/17  Flu- refused  RSV-  Zoster- 4/19  Td- 7/22  Hep C- 10/18  Advance Directives- does not want at this time but wants daughter Silvina to make decisions  ETOH screen- 8/2/23  CV risk- 2/7/24    Patient Care Team:  Stefan Kaur DO as PCP - General  Stefan Kaur DO as PCP - [employer] PCP  Melinda Srinivasan MD as Consulting Physician (Sleep Medicine)     Review of Systems   Constitutional:  Negative for chills, fatigue and fever.   HENT:  Negative for congestion, ear discharge, ear pain, hearing loss, nosebleeds, sore throat, tinnitus and trouble swallowing.    Eyes:  Negative for pain, redness and visual disturbance.   Respiratory:  Negative for cough, chest tightness, shortness of breath and wheezing.    Cardiovascular:  Negative for chest pain, palpitations and leg swelling.   Gastrointestinal:  Negative for abdominal pain, blood in stool, constipation, diarrhea, nausea and vomiting.   Endocrine: Negative for cold intolerance, heat intolerance, polydipsia, polyphagia and polyuria.   Genitourinary:   "Negative for dysuria, frequency, hematuria and urgency.   Musculoskeletal:  Positive for arthralgias. Negative for back pain and gait problem.   Skin:  Negative for color change and rash.   Neurological:  Positive for numbness. Negative for dizziness, tremors, syncope, weakness and headaches.   Hematological:  Negative for adenopathy. Does not bruise/bleed easily.   Psychiatric/Behavioral:  Positive for dysphoric mood. The patient is nervous/anxious.        Objective   Vitals:  /80   Pulse 82   Ht 1.803 m (5' 11\")   Wt 100 kg (221 lb)   SpO2 98%   BMI 30.82 kg/m²       Physical Exam  Vitals and nursing note reviewed.   Constitutional:       Appearance: Normal appearance.   HENT:      Head: Normocephalic and atraumatic.      Right Ear: Tympanic membrane, ear canal and external ear normal.      Left Ear: Tympanic membrane, ear canal and external ear normal.      Nose: Nose normal.      Mouth/Throat:      Mouth: Mucous membranes are dry.      Pharynx: Oropharynx is clear.   Eyes:      Extraocular Movements: Extraocular movements intact.      Conjunctiva/sclera: Conjunctivae normal.      Pupils: Pupils are equal, round, and reactive to light.   Cardiovascular:      Rate and Rhythm: Normal rate and regular rhythm.      Pulses: Normal pulses.      Heart sounds: Normal heart sounds.   Pulmonary:      Effort: Pulmonary effort is normal.      Breath sounds: Normal breath sounds.   Abdominal:      General: Abdomen is flat. Bowel sounds are normal.      Palpations: Abdomen is soft.   Musculoskeletal:      Cervical back: Normal range of motion and neck supple.   Skin:     Capillary Refill: Capillary refill takes less than 2 seconds.   Neurological:      General: No focal deficit present.      Mental Status: He is alert and oriented to person, place, and time.   Psychiatric:         Mood and Affect: Mood is depressed.         Behavior: Behavior normal.         Assessment/Plan   Problem List Items Addressed This Visit  "      Primary hypertension    Moderate episode of recurrent major depressive disorder (CMS/HCC)    Alcohol dependence, binge pattern (CMS/HCC)     Other Visit Diagnoses       Routine general medical examination at health care facility    -  Primary        Renewed/continued rest of medications  Check labs     HTN, controlled- continue meds, low sodium diet     Obesity- caloric restriction, increase CV exercise     Hyperlipidemia- continue meds, low fat/cholesterol diet     MDD- Venlafaxine, bupropion, CV exercise, to counseling     KELLY- F/U sleep medicine     ED- see urology, control BP and cholesterol, cialis     BPH- avoid caffeine, avoid fluids before bed     Alcohol Overuse- cut down to 1-2 a day    LBP- PT, heat    Cardiovascular risk discussed and, if needed, lifestyle modifications recommended, including nutritional choices, exercise, and elimination of habits contributing to risk. We agreed on a plan to reduce the current cardiovascular risk. See the ASCVD Risk  Plus (23.7%) for data discussed regarding risk and risk reduction opportunities. Aspirin use/disuse was discussed after reviewing updated guidelines

## 2024-02-29 ENCOUNTER — OFFICE VISIT (OUTPATIENT)
Dept: PRIMARY CARE | Facility: CLINIC | Age: 74
End: 2024-02-29
Payer: MEDICARE

## 2024-02-29 VITALS
HEART RATE: 89 BPM | OXYGEN SATURATION: 99 % | BODY MASS INDEX: 30.4 KG/M2 | WEIGHT: 218 LBS | DIASTOLIC BLOOD PRESSURE: 90 MMHG | TEMPERATURE: 98.5 F | SYSTOLIC BLOOD PRESSURE: 132 MMHG

## 2024-02-29 DIAGNOSIS — K11.7 EXCESSIVE SALIVATION: Primary | ICD-10-CM

## 2024-02-29 DIAGNOSIS — J02.9 PHARYNGITIS, UNSPECIFIED ETIOLOGY: ICD-10-CM

## 2024-02-29 LAB — POC RAPID STREP: NEGATIVE

## 2024-02-29 PROCEDURE — 3075F SYST BP GE 130 - 139MM HG: CPT | Performed by: FAMILY MEDICINE

## 2024-02-29 PROCEDURE — 1159F MED LIST DOCD IN RCRD: CPT | Performed by: FAMILY MEDICINE

## 2024-02-29 PROCEDURE — 1123F ACP DISCUSS/DSCN MKR DOCD: CPT | Performed by: FAMILY MEDICINE

## 2024-02-29 PROCEDURE — 99213 OFFICE O/P EST LOW 20 MIN: CPT | Performed by: FAMILY MEDICINE

## 2024-02-29 PROCEDURE — 3080F DIAST BP >= 90 MM HG: CPT | Performed by: FAMILY MEDICINE

## 2024-02-29 PROCEDURE — 3008F BODY MASS INDEX DOCD: CPT | Performed by: FAMILY MEDICINE

## 2024-02-29 PROCEDURE — 1160F RVW MEDS BY RX/DR IN RCRD: CPT | Performed by: FAMILY MEDICINE

## 2024-02-29 PROCEDURE — 87880 STREP A ASSAY W/OPTIC: CPT | Performed by: FAMILY MEDICINE

## 2024-02-29 PROCEDURE — 1036F TOBACCO NON-USER: CPT | Performed by: FAMILY MEDICINE

## 2024-02-29 NOTE — PROGRESS NOTES
Subjective   Patient ID: Gerardo Santiago is a 74 y.o. male who presents for wants checked for strep (Exposed to strep).  Pt reports excessive saliva production for 1.5 weeks. He denies any other symptoms. He was concerned of strep throat due to his girlfriend testing positive. He denies any sore throat, fever, nausea, or changes in bowel movements. He did report recent tooth crown being damaged, but denies any oral pain.         Current Outpatient Medications:     aspirin 81 mg EC tablet, Take by mouth., Disp: , Rfl:     buPROPion XL (Wellbutrin XL) 300 mg 24 hr tablet, TAKE 1 TABLET (300 MG) BY MOUTH ONCE DAILY IN THE MORNING. DO NOT CRUSH, CHEW, OR SPLIT., Disp: 90 tablet, Rfl: 1    hydrOXYzine HCL (Atarax) 10 mg tablet, TAKE 1 TABLET BY MOUTH FOUR TIMES A DAY AS NEEDED, Disp: 40 tablet, Rfl: 1    simvastatin (Zocor) 40 mg tablet, TAKE 1 TABLET BY MOUTH EVERY DAY, Disp: 90 tablet, Rfl: 1    tadalafil (Cialis) 20 mg tablet, Take 1 tablet (20 mg) by mouth once daily as needed for erectile dysfunction., Disp: 6 tablet, Rfl: 11    vardenafil (Levitra) 20 mg tablet, Take 1 tablet (20 mg) by mouth once daily as needed for erectile dysfunction., Disp: 6 tablet, Rfl: 11    venlafaxine XR (Effexor-XR) 150 mg 24 hr capsule, TAKE 1 CAPSULE (150 MG) BY MOUTH ONCE DAILY. TAKE WITH FOOD., Disp: 90 capsule, Rfl: 1    lisinopriL-hydrochlorothiazide 10-12.5 mg tablet, Take 1 tablet by mouth once daily., Disp: 90 tablet, Rfl: 1    venlafaxine XR (Effexor-XR) 75 mg 24 hr capsule, Take 1 capsule (75 mg) by mouth once daily. Take with food., Disp: 90 capsule, Rfl: 1   Past Surgical History:   Procedure Laterality Date    TONSILLECTOMY  11/06/2014    Tonsillectomy With Adenoidectomy      Past Medical History:   Diagnosis Date    Essential (primary) hypertension 10/10/2022    Hypertension    Hypersomnia, unspecified 11/06/2014    Hypersomnia, organic    Other acute sinusitis 04/04/2023    Personal history of other specified conditions  2016    History of fatigue    Pure hypercholesterolemia, unspecified 10/10/2022    Hypercholesterolemia     Social History     Tobacco Use    Smoking status: Former     Types: Cigarettes     Quit date:      Years since quittin.1    Smokeless tobacco: Never   Substance Use Topics    Alcohol use: Yes     Alcohol/week: 12.0 standard drinks of alcohol     Types: 12 Cans of beer per week    Drug use: Never      No family history on file.   Review of Systems    Objective   /90   Pulse 89   Temp 36.9 °C (98.5 °F)   Wt 98.9 kg (218 lb)   SpO2 99%   BMI 30.40 kg/m²    Physical Exam  Vitals and nursing note reviewed.   Constitutional:       General: He is not in acute distress.     Appearance: Normal appearance. He is not ill-appearing.   HENT:      Head: Normocephalic and atraumatic.      Right Ear: Tympanic membrane, ear canal and external ear normal.      Left Ear: Tympanic membrane, ear canal and external ear normal.      Nose: Nose normal. No congestion or rhinorrhea.      Mouth/Throat:      Mouth: Mucous membranes are moist.      Pharynx: Oropharynx is clear. No oropharyngeal exudate or posterior oropharyngeal erythema.   Eyes:      Extraocular Movements: Extraocular movements intact.      Conjunctiva/sclera: Conjunctivae normal.      Pupils: Pupils are equal, round, and reactive to light.   Musculoskeletal:      Cervical back: Normal range of motion and neck supple. No tenderness.   Lymphadenopathy:      Cervical: No cervical adenopathy.   Skin:     Capillary Refill: Capillary refill takes less than 2 seconds.   Neurological:      General: No focal deficit present.      Mental Status: He is alert and oriented to person, place, and time.   Psychiatric:         Mood and Affect: Mood normal.         Behavior: Behavior normal.         Assessment/Plan   Problem List Items Addressed This Visit    None  Visit Diagnoses       Excessive salivation    -  Primary    Pharyngitis, unspecified etiology         Relevant Orders    POCT rapid strep A manually resulted (Completed)        Suspect excess salivation from dental issue  If does resolve after tooth fixed then will need to do labs/work up    Patient understands and agrees with treatment plan    Stefan Kaur, DO

## 2024-03-29 DIAGNOSIS — N52.9 MALE ERECTILE DISORDER OF ORGANIC ORIGIN: ICD-10-CM

## 2024-03-29 DIAGNOSIS — I10 ESSENTIAL (PRIMARY) HYPERTENSION: ICD-10-CM

## 2024-03-29 DIAGNOSIS — F33.1 MODERATE EPISODE OF RECURRENT MAJOR DEPRESSIVE DISORDER (MULTI): ICD-10-CM

## 2024-03-29 RX ORDER — VENLAFAXINE HYDROCHLORIDE 75 MG/1
75 CAPSULE, EXTENDED RELEASE ORAL DAILY
Qty: 90 CAPSULE | Refills: 1 | Status: SHIPPED | OUTPATIENT
Start: 2024-03-29 | End: 2024-09-25

## 2024-03-29 RX ORDER — TADALAFIL 20 MG/1
20 TABLET ORAL DAILY PRN
Qty: 9 TABLET | Refills: 11 | Status: SHIPPED | OUTPATIENT
Start: 2024-03-29

## 2024-03-29 RX ORDER — LISINOPRIL AND HYDROCHLOROTHIAZIDE 10; 12.5 MG/1; MG/1
1 TABLET ORAL DAILY
Qty: 90 TABLET | Refills: 1 | Status: SHIPPED | OUTPATIENT
Start: 2024-03-29

## 2024-06-07 DIAGNOSIS — F33.1 MODERATE EPISODE OF RECURRENT MAJOR DEPRESSIVE DISORDER (MULTI): ICD-10-CM

## 2024-06-07 RX ORDER — BUPROPION HYDROCHLORIDE 300 MG/1
300 TABLET ORAL EVERY MORNING
Qty: 90 TABLET | Refills: 1 | Status: SHIPPED | OUTPATIENT
Start: 2024-06-07 | End: 2024-12-04

## 2024-06-07 RX ORDER — VENLAFAXINE HYDROCHLORIDE 150 MG/1
150 CAPSULE, EXTENDED RELEASE ORAL DAILY
Qty: 90 CAPSULE | Refills: 1 | Status: SHIPPED | OUTPATIENT
Start: 2024-06-07 | End: 2024-12-04

## 2024-07-31 DIAGNOSIS — Z00.00 ENCOUNTER FOR GENERAL ADULT MEDICAL EXAMINATION WITHOUT ABNORMAL FINDINGS: ICD-10-CM

## 2024-07-31 RX ORDER — HYDROXYZINE HYDROCHLORIDE 10 MG/1
10 TABLET, FILM COATED ORAL EVERY 4 HOURS PRN
Qty: 40 TABLET | Refills: 1 | Status: SHIPPED | OUTPATIENT
Start: 2024-07-31

## 2024-08-07 ENCOUNTER — APPOINTMENT (OUTPATIENT)
Dept: PRIMARY CARE | Facility: CLINIC | Age: 74
End: 2024-08-07
Payer: MEDICARE

## 2024-08-27 ENCOUNTER — APPOINTMENT (OUTPATIENT)
Dept: PRIMARY CARE | Facility: CLINIC | Age: 74
End: 2024-08-27

## 2024-08-27 VITALS
BODY MASS INDEX: 29.4 KG/M2 | DIASTOLIC BLOOD PRESSURE: 78 MMHG | OXYGEN SATURATION: 97 % | SYSTOLIC BLOOD PRESSURE: 132 MMHG | HEIGHT: 71 IN | WEIGHT: 210 LBS | HEART RATE: 91 BPM

## 2024-08-27 DIAGNOSIS — F33.1 MODERATE EPISODE OF RECURRENT MAJOR DEPRESSIVE DISORDER (MULTI): ICD-10-CM

## 2024-08-27 DIAGNOSIS — I10 PRIMARY HYPERTENSION: Primary | ICD-10-CM

## 2024-08-27 DIAGNOSIS — Z12.5 SCREENING FOR MALIGNANT NEOPLASM OF PROSTATE: ICD-10-CM

## 2024-08-27 DIAGNOSIS — F10.20 ALCOHOL DEPENDENCE, BINGE PATTERN (MULTI): ICD-10-CM

## 2024-08-27 DIAGNOSIS — E78.00 HYPERCHOLESTEROLEMIA: ICD-10-CM

## 2024-08-27 PROBLEM — E66.811 CLASS 1 OBESITY DUE TO EXCESS CALORIES WITHOUT SERIOUS COMORBIDITY WITH BODY MASS INDEX (BMI) OF 30.0 TO 30.9 IN ADULT: Status: RESOLVED | Noted: 2023-04-04 | Resolved: 2024-08-27

## 2024-08-27 PROBLEM — E66.09 CLASS 1 OBESITY DUE TO EXCESS CALORIES WITHOUT SERIOUS COMORBIDITY WITH BODY MASS INDEX (BMI) OF 30.0 TO 30.9 IN ADULT: Status: RESOLVED | Noted: 2023-04-04 | Resolved: 2024-08-27

## 2024-08-27 LAB
ALBUMIN SERPL BCP-MCNC: 4.3 G/DL (ref 3.4–5)
ALP SERPL-CCNC: 56 U/L (ref 33–136)
ALT SERPL W P-5'-P-CCNC: 12 U/L (ref 10–52)
ANION GAP SERPL CALC-SCNC: 15 MMOL/L (ref 10–20)
AST SERPL W P-5'-P-CCNC: 15 U/L (ref 9–39)
BILIRUB SERPL-MCNC: 0.6 MG/DL (ref 0–1.2)
BUN SERPL-MCNC: 17 MG/DL (ref 6–23)
CALCIUM SERPL-MCNC: 9.5 MG/DL (ref 8.6–10.3)
CHLORIDE SERPL-SCNC: 100 MMOL/L (ref 98–107)
CHOLEST SERPL-MCNC: 152 MG/DL (ref 0–199)
CHOLESTEROL/HDL RATIO: 2.2
CO2 SERPL-SCNC: 28 MMOL/L (ref 21–32)
CREAT SERPL-MCNC: 0.98 MG/DL (ref 0.5–1.3)
EGFRCR SERPLBLD CKD-EPI 2021: 82 ML/MIN/1.73M*2
GLUCOSE SERPL-MCNC: 87 MG/DL (ref 74–99)
HDLC SERPL-MCNC: 69 MG/DL
LDLC SERPL CALC-MCNC: 66 MG/DL
NON HDL CHOLESTEROL: 83 MG/DL (ref 0–149)
POC APPEARANCE, URINE: CLEAR
POC BILIRUBIN, URINE: NEGATIVE
POC BLOOD, URINE: ABNORMAL
POC COLOR, URINE: ABNORMAL
POC GLUCOSE, URINE: NEGATIVE MG/DL
POC KETONES, URINE: ABNORMAL MG/DL
POC LEUKOCYTES, URINE: NEGATIVE
POC NITRITE,URINE: NEGATIVE
POC PH, URINE: 5.5 PH
POC PROTEIN, URINE: ABNORMAL MG/DL
POC SPECIFIC GRAVITY, URINE: >=1.03
POC UROBILINOGEN, URINE: 0.2 EU/DL
POTASSIUM SERPL-SCNC: 4.9 MMOL/L (ref 3.5–5.3)
PROT SERPL-MCNC: 6.7 G/DL (ref 6.4–8.2)
PSA SERPL-MCNC: 2.94 NG/ML
SODIUM SERPL-SCNC: 138 MMOL/L (ref 136–145)
TRIGL SERPL-MCNC: 85 MG/DL (ref 0–149)
VLDL: 17 MG/DL (ref 0–40)

## 2024-08-27 PROCEDURE — 3008F BODY MASS INDEX DOCD: CPT | Performed by: FAMILY MEDICINE

## 2024-08-27 PROCEDURE — 99214 OFFICE O/P EST MOD 30 MIN: CPT | Performed by: FAMILY MEDICINE

## 2024-08-27 PROCEDURE — 1160F RVW MEDS BY RX/DR IN RCRD: CPT | Performed by: FAMILY MEDICINE

## 2024-08-27 PROCEDURE — 81003 URINALYSIS AUTO W/O SCOPE: CPT | Performed by: FAMILY MEDICINE

## 2024-08-27 PROCEDURE — 1036F TOBACCO NON-USER: CPT | Performed by: FAMILY MEDICINE

## 2024-08-27 PROCEDURE — 1159F MED LIST DOCD IN RCRD: CPT | Performed by: FAMILY MEDICINE

## 2024-08-27 PROCEDURE — 80061 LIPID PANEL: CPT

## 2024-08-27 PROCEDURE — G0103 PSA SCREENING: HCPCS

## 2024-08-27 PROCEDURE — 3075F SYST BP GE 130 - 139MM HG: CPT | Performed by: FAMILY MEDICINE

## 2024-08-27 PROCEDURE — 80053 COMPREHEN METABOLIC PANEL: CPT

## 2024-08-27 PROCEDURE — 1123F ACP DISCUSS/DSCN MKR DOCD: CPT | Performed by: FAMILY MEDICINE

## 2024-08-27 PROCEDURE — 3078F DIAST BP <80 MM HG: CPT | Performed by: FAMILY MEDICINE

## 2024-08-27 RX ORDER — ARIPIPRAZOLE 5 MG/1
5 TABLET ORAL DAILY
Qty: 30 TABLET | Refills: 0 | Status: SHIPPED | OUTPATIENT
Start: 2024-08-27 | End: 2024-09-26

## 2024-08-27 ASSESSMENT — ENCOUNTER SYMPTOMS
OCCASIONAL FEELINGS OF UNSTEADINESS: 0
LOSS OF SENSATION IN FEET: 0
DEPRESSION: 0

## 2024-08-27 NOTE — PROGRESS NOTES
Subjective   Patient ID: Gerardo Santiago is a 74 y.o. male who presents for Annual Exam (Check up ).  HPI  No SE meds  Mood is not good  Having a lot of emotional pain  Not eating   Worthless, hopeless  Some SI- no plan  No HI  Feels down  Some anger issues at times     No CP, SOB, palpitations, numbness, weakness, dizziness, HA, vision changes     Drinking everyday about 6 beers     Ophtho- 4/21  Dentist- every 6 months  Colonoscopy- 4/22- follow up 5 years  ISABEL- refused  PSA- 8/23  UA/Micro- 3/23  Lung CT- (less then 20 pack year history)  AAA- 5/21  EKG-  Pneumovax- 10/19  Prevnar- 5/17  Flu- refused  RSV-  Zoster- 4/19  Td- 7/22  Hep C- 10/18  Advance Directives- does not want at this time but wants daughter Silvina to make decisions  ETOH screen- 8/2/23  CV risk- 2/7/24  AWV- 2/7/24    Current Outpatient Medications:     aspirin 81 mg EC tablet, Take by mouth., Disp: , Rfl:     hydrOXYzine HCL (Atarax) 10 mg tablet, Take 1 tablet (10 mg) by mouth every 4 hours if needed for anxiety., Disp: 40 tablet, Rfl: 1    lisinopriL-hydrochlorothiazide 10-12.5 mg tablet, TAKE 1 TABLET BY MOUTH EVERY DAY, Disp: 90 tablet, Rfl: 1    simvastatin (Zocor) 40 mg tablet, TAKE 1 TABLET BY MOUTH EVERY DAY, Disp: 90 tablet, Rfl: 1    tadalafil 20 mg tablet, Take 1 tablet (20 mg) by mouth once daily as needed for erectile dysfunction., Disp: 9 tablet, Rfl: 11    venlafaxine XR (Effexor-XR) 150 mg 24 hr capsule, TAKE 1 CAPSULE (150 MG) BY MOUTH ONCE DAILY. TAKE WITH FOOD., Disp: 90 capsule, Rfl: 1    venlafaxine XR (Effexor-XR) 75 mg 24 hr capsule, TAKE 1 CAPSULE (75 MG) BY MOUTH ONCE DAILY. TAKE WITH FOOD., Disp: 90 capsule, Rfl: 1    ARIPiprazole (Abilify) 5 mg tablet, Take 1 tablet (5 mg) by mouth once daily., Disp: 30 tablet, Rfl: 0   Past Surgical History:   Procedure Laterality Date    TONSILLECTOMY  11/06/2014    Tonsillectomy With Adenoidectomy      Past Medical History:   Diagnosis Date    Class 1 obesity due to excess calories  "without serious comorbidity with body mass index (BMI) of 30.0 to 30.9 in adult 2023    Essential (primary) hypertension 10/10/2022    Hypertension    Hypersomnia, unspecified 2014    Hypersomnia, organic    Other acute sinusitis 2023    Personal history of other specified conditions 2016    History of fatigue    Pure hypercholesterolemia, unspecified 10/10/2022    Hypercholesterolemia     Social History     Tobacco Use    Smoking status: Former     Current packs/day: 0.00     Types: Cigarettes     Quit date:      Years since quittin.6    Smokeless tobacco: Never   Substance Use Topics    Alcohol use: Yes     Alcohol/week: 12.0 standard drinks of alcohol     Types: 12 Cans of beer per week    Drug use: Never      No family history on file.   Review of Systems    Objective   /78   Pulse 91   Ht 1.803 m (5' 11\")   Wt 95.3 kg (210 lb)   SpO2 97%   BMI 29.29 kg/m²    Physical Exam  Vitals and nursing note reviewed.   Constitutional:       Appearance: Normal appearance.   HENT:      Head: Normocephalic and atraumatic.      Right Ear: Tympanic membrane, ear canal and external ear normal.      Left Ear: Tympanic membrane, ear canal and external ear normal.      Nose: Nose normal.      Mouth/Throat:      Mouth: Mucous membranes are moist.      Pharynx: Oropharynx is clear.   Eyes:      Extraocular Movements: Extraocular movements intact.      Conjunctiva/sclera: Conjunctivae normal.      Pupils: Pupils are equal, round, and reactive to light.   Cardiovascular:      Rate and Rhythm: Normal rate and regular rhythm.      Pulses: Normal pulses.      Heart sounds: Normal heart sounds.   Pulmonary:      Effort: Pulmonary effort is normal.      Breath sounds: Normal breath sounds.   Abdominal:      General: Abdomen is flat. Bowel sounds are normal.      Palpations: Abdomen is soft.   Musculoskeletal:      Cervical back: Normal range of motion and neck supple.   Skin:     Capillary " Refill: Capillary refill takes less than 2 seconds.   Neurological:      General: No focal deficit present.      Mental Status: He is alert and oriented to person, place, and time.   Psychiatric:         Mood and Affect: Mood is depressed.         Behavior: Behavior normal.         Assessment/Plan   Problem List Items Addressed This Visit       Primary hypertension - Primary    Relevant Orders    Comprehensive Metabolic Panel (Completed)    POCT UA Automated manually resulted (Completed)    Moderate episode of recurrent major depressive disorder (Multi)    Relevant Medications    ARIPiprazole (Abilify) 5 mg tablet    Hypercholesterolemia    Relevant Orders    Lipid Panel (Completed)    Comprehensive Metabolic Panel (Completed)    Alcohol dependence, binge pattern (Multi)     Other Visit Diagnoses       Screening for malignant neoplasm of prostate        Relevant Orders    Prostate Specific Antigen, Screen (Completed)        Renewed/continued rest of medications  Check labs     HTN, controlled- continue meds, low sodium diet     Obesity- caloric restriction, increase CV exercise     Hyperlipidemia- continue meds, low fat/cholesterol diet     MDD- Venlafaxine, CV exercise, to counseling, add abilify, call 1 month with status, encouraged counseling     KELLY- F/U sleep medicine     ED- see urology, control BP and cholesterol, cialis     BPH- avoid caffeine, avoid fluids before bed     Alcohol Overuse- cut down to 1-2 a day    Patient understands and agrees with treatment plan    Stefan Kaur, DO

## 2024-08-27 NOTE — PATIENT INSTRUCTIONS
Counseling Services         Suicide Hotline: (661) 113-7843         [facility]    [phone]    [address] [place] [address]    [email or web address]         [facility]      [phone]    [address] [place]   [address]    [place]         [facility]    [phone] [address]    [place] [address]    [employer]         [facility]    [phone]    Variety of locations    [email or web address]         [facility]    [phone] [address]    [place] [address]         [facility]    [phone]    Will see people at the [facility]    [phone]    [address]    [place] [address]         [facility]    General number: [phone]    <[email or web address]    *no kids*         [facility]    [phone] [address] [place] [address]    *no kids*         [facility]    [phone] [address] [place] [address]    *no kids*         [facility]    [phone] [address]    [place] [address]         [facility]: [facility]    [phone]    [facility]    [address] [place], [address]    Walk in hours: Mon - Fri 1:00 PM - 5:00 PM         [facility]: [facility]    [phone] <tel:[phone]>)    [address] [place], [place], [address]    Walk in hours: Mon - Fri  11:00 AM - 1:30 PM

## 2024-08-28 PROBLEM — N06.9 ISOLATED PROTEINURIA WITH MORPHOLOGIC LESION: Status: ACTIVE | Noted: 2024-08-28

## 2024-08-28 PROBLEM — R31.21 ASYMPTOMATIC MICROSCOPIC HEMATURIA: Status: ACTIVE | Noted: 2024-08-28

## 2024-09-18 DIAGNOSIS — F33.1 MODERATE EPISODE OF RECURRENT MAJOR DEPRESSIVE DISORDER: ICD-10-CM

## 2024-09-18 DIAGNOSIS — F33.1 MODERATE EPISODE OF RECURRENT MAJOR DEPRESSIVE DISORDER: Primary | ICD-10-CM

## 2024-09-18 RX ORDER — ARIPIPRAZOLE 10 MG/1
10 TABLET, ORALLY DISINTEGRATING ORAL DAILY
Qty: 30 TABLET | Refills: 0 | Status: SHIPPED | OUTPATIENT
Start: 2024-09-18 | End: 2024-10-18

## 2024-09-18 RX ORDER — ARIPIPRAZOLE 5 MG/1
5 TABLET ORAL DAILY
Qty: 90 TABLET | Refills: 1 | Status: SHIPPED | OUTPATIENT
Start: 2024-09-18 | End: 2024-09-18 | Stop reason: DRUGHIGH

## 2024-09-18 NOTE — PROGRESS NOTES
Subjective   Patient ID: Gerardo Santiago is a 74 y.o. male who presents for No chief complaint on file..  HPI    Review of Systems    Objective   Physical Exam    Assessment/Plan            Stefan Kaur DO 09/18/24 5:17 PM

## 2024-09-27 DIAGNOSIS — I10 ESSENTIAL (PRIMARY) HYPERTENSION: ICD-10-CM

## 2024-09-27 DIAGNOSIS — F33.1 MODERATE EPISODE OF RECURRENT MAJOR DEPRESSIVE DISORDER: Primary | ICD-10-CM

## 2024-09-27 RX ORDER — LISINOPRIL AND HYDROCHLOROTHIAZIDE 10; 12.5 MG/1; MG/1
1 TABLET ORAL DAILY
Qty: 90 TABLET | Refills: 1 | Status: SHIPPED | OUTPATIENT
Start: 2024-09-27

## 2024-09-27 RX ORDER — ARIPIPRAZOLE 10 MG/1
10 TABLET ORAL DAILY
Qty: 30 TABLET | Refills: 0 | Status: SHIPPED | OUTPATIENT
Start: 2024-09-27 | End: 2024-10-27

## 2024-12-10 DIAGNOSIS — F33.1 MODERATE EPISODE OF RECURRENT MAJOR DEPRESSIVE DISORDER: ICD-10-CM

## 2024-12-10 RX ORDER — ARIPIPRAZOLE 10 MG/1
10 TABLET ORAL DAILY
Qty: 90 TABLET | Refills: 1 | Status: SHIPPED | OUTPATIENT
Start: 2024-12-10 | End: 2025-06-08

## 2024-12-18 DIAGNOSIS — F33.1 MODERATE EPISODE OF RECURRENT MAJOR DEPRESSIVE DISORDER: ICD-10-CM

## 2024-12-18 RX ORDER — VENLAFAXINE HYDROCHLORIDE 150 MG/1
150 CAPSULE, EXTENDED RELEASE ORAL DAILY
Qty: 90 CAPSULE | Refills: 1 | Status: SHIPPED | OUTPATIENT
Start: 2024-12-18 | End: 2025-06-16

## 2024-12-30 DIAGNOSIS — F41.1 GENERALIZED ANXIETY DISORDER: Primary | ICD-10-CM

## 2024-12-30 RX ORDER — HYDROXYZINE PAMOATE 25 MG/1
25 CAPSULE ORAL 3 TIMES DAILY PRN
Qty: 40 CAPSULE | Refills: 0 | Status: SHIPPED | OUTPATIENT
Start: 2024-12-30 | End: 2025-01-12

## 2025-01-08 DIAGNOSIS — E78.00 PURE HYPERCHOLESTEROLEMIA, UNSPECIFIED: ICD-10-CM

## 2025-01-08 RX ORDER — SIMVASTATIN 40 MG/1
TABLET, FILM COATED ORAL
Qty: 90 TABLET | Refills: 1 | Status: SHIPPED | OUTPATIENT
Start: 2025-01-08

## 2025-02-17 ENCOUNTER — TELEPHONE (OUTPATIENT)
Dept: PRIMARY CARE | Facility: CLINIC | Age: 75
End: 2025-02-17
Payer: COMMERCIAL

## 2025-02-17 DIAGNOSIS — F33.1 MODERATE EPISODE OF RECURRENT MAJOR DEPRESSIVE DISORDER: Primary | ICD-10-CM

## 2025-02-17 RX ORDER — PAROXETINE HYDROCHLORIDE 20 MG/1
20 TABLET, FILM COATED ORAL EVERY MORNING
Qty: 30 TABLET | Refills: 5 | Status: SHIPPED | OUTPATIENT
Start: 2025-02-17 | End: 2025-08-16

## 2025-02-17 NOTE — TELEPHONE ENCOUNTER
A lot of sadness, apathy, anxiety  No SI/HI    Effexor XR 75 mg for 1 week and then stop  Paxil 20 mg once a day

## 2025-02-17 NOTE — TELEPHONE ENCOUNTER
I need to speak to dr velasquez about my medications.  They do not seem to be working.  I need to talk to him.

## 2025-02-27 ENCOUNTER — APPOINTMENT (OUTPATIENT)
Dept: PRIMARY CARE | Facility: CLINIC | Age: 75
End: 2025-02-27
Payer: COMMERCIAL

## 2025-02-27 VITALS
DIASTOLIC BLOOD PRESSURE: 82 MMHG | HEART RATE: 71 BPM | BODY MASS INDEX: 30.21 KG/M2 | HEIGHT: 70 IN | WEIGHT: 211 LBS | SYSTOLIC BLOOD PRESSURE: 136 MMHG | OXYGEN SATURATION: 98 %

## 2025-02-27 DIAGNOSIS — Z13.89 ENCOUNTER FOR SCREENING FOR OTHER DISORDER: ICD-10-CM

## 2025-02-27 DIAGNOSIS — E78.00 HYPERCHOLESTEROLEMIA: ICD-10-CM

## 2025-02-27 DIAGNOSIS — G47.33 OBSTRUCTIVE SLEEP APNEA: ICD-10-CM

## 2025-02-27 DIAGNOSIS — Z71.89 CARDIAC RISK COUNSELING: ICD-10-CM

## 2025-02-27 DIAGNOSIS — F33.1 MODERATE EPISODE OF RECURRENT MAJOR DEPRESSIVE DISORDER: ICD-10-CM

## 2025-02-27 DIAGNOSIS — Z00.00 ROUTINE GENERAL MEDICAL EXAMINATION AT HEALTH CARE FACILITY: Primary | ICD-10-CM

## 2025-02-27 DIAGNOSIS — I10 PRIMARY HYPERTENSION: ICD-10-CM

## 2025-02-27 DIAGNOSIS — F10.20 ALCOHOL DEPENDENCE, BINGE PATTERN (MULTI): ICD-10-CM

## 2025-02-27 ASSESSMENT — LIFESTYLE VARIABLES
HOW OFTEN DURING THE LAST YEAR HAVE YOU FAILED TO DO WHAT WAS NORMALLY EXPECTED FROM YOU BECAUSE OF DRINKING: NEVER
HOW OFTEN DURING THE LAST YEAR HAVE YOU BEEN UNABLE TO REMEMBER WHAT HAPPENED THE NIGHT BEFORE BECAUSE YOU HAD BEEN DRINKING: NEVER
HOW OFTEN DURING THE LAST YEAR HAVE YOU NEEDED AN ALCOHOLIC DRINK FIRST THING IN THE MORNING TO GET YOURSELF GOING AFTER A NIGHT OF HEAVY DRINKING: NEVER
HOW MANY STANDARD DRINKS CONTAINING ALCOHOL DO YOU HAVE ON A TYPICAL DAY: PATIENT DOES NOT DRINK
HOW OFTEN DO YOU HAVE SIX OR MORE DRINKS ON ONE OCCASION: WEEKLY
SKIP TO QUESTIONS 9-10: 0
AUDIT-C TOTAL SCORE: 5
HOW OFTEN DURING THE LAST YEAR HAVE YOU HAD A FEELING OF GUILT OR REMORSE AFTER DRINKING: NEVER
AUDIT TOTAL SCORE: 0
HAS A RELATIVE, FRIEND, DOCTOR, OR ANOTHER HEALTH PROFESSIONAL EXPRESSED CONCERN ABOUT YOUR DRINKING OR SUGGESTED YOU CUT DOWN: NO
HOW OFTEN DO YOU HAVE A DRINK CONTAINING ALCOHOL: 2-4 TIMES A MONTH
HAVE YOU OR SOMEONE ELSE BEEN INJURED AS A RESULT OF YOUR DRINKING: NO
AUDIT-C TOTAL SCORE: 5
AUDIT TOTAL SCORE: 5
HOW OFTEN DURING THE LAST YEAR HAVE YOU FOUND THAT YOU WERE NOT ABLE TO STOP DRINKING ONCE YOU HAD STARTED: NEVER

## 2025-02-27 ASSESSMENT — ENCOUNTER SYMPTOMS
ABDOMINAL PAIN: 0
BACK PAIN: 0
EYE REDNESS: 0
DIARRHEA: 0
BRUISES/BLEEDS EASILY: 0
POLYDIPSIA: 0
NAUSEA: 0
HEADACHES: 0
WHEEZING: 0
NUMBNESS: 1
COUGH: 0
SHORTNESS OF BREATH: 0
DYSPHORIC MOOD: 1
VOMITING: 0
FATIGUE: 0
CONSTIPATION: 0
PALPITATIONS: 0
TROUBLE SWALLOWING: 0
COLOR CHANGE: 0
ADENOPATHY: 0
DYSURIA: 0
EYE PAIN: 0
FEVER: 0
WEAKNESS: 0
CHEST TIGHTNESS: 0
POLYPHAGIA: 0
SORE THROAT: 0
DIZZINESS: 0
HEMATURIA: 0
NERVOUS/ANXIOUS: 1
CHILLS: 0
TREMORS: 0
BLOOD IN STOOL: 0
FREQUENCY: 0
ARTHRALGIAS: 1

## 2025-02-27 ASSESSMENT — ACTIVITIES OF DAILY LIVING (ADL)
MANAGING_FINANCES: INDEPENDENT
GROCERY_SHOPPING: INDEPENDENT
BATHING: INDEPENDENT
TAKING_MEDICATION: INDEPENDENT
DRESSING: INDEPENDENT
DOING_HOUSEWORK: INDEPENDENT

## 2025-02-27 ASSESSMENT — PATIENT HEALTH QUESTIONNAIRE - PHQ9
10. IF YOU CHECKED OFF ANY PROBLEMS, HOW DIFFICULT HAVE THESE PROBLEMS MADE IT FOR YOU TO DO YOUR WORK, TAKE CARE OF THINGS AT HOME, OR GET ALONG WITH OTHER PEOPLE: SOMEWHAT DIFFICULT
2. FEELING DOWN, DEPRESSED OR HOPELESS: SEVERAL DAYS
1. LITTLE INTEREST OR PLEASURE IN DOING THINGS: SEVERAL DAYS
SUM OF ALL RESPONSES TO PHQ9 QUESTIONS 1 AND 2: 2

## 2025-02-27 NOTE — PROGRESS NOTES
Subjective   Reason for Visit: Gerardo Santiago is an 75 y.o. male here for a Medicare Wellness visit.     Past Medical, Surgical, and Family History reviewed and updated in chart.    Reviewed all medications by prescribing practitioner or clinical pharmacist (such as prescriptions, OTCs, herbal therapies and supplements) and documented in the medical record.    HPI  No SE meds  Still waiting for the Paxil to kick in  Taking Abilify in the morning  Some hopeless or worthless  No SI/HI     No CP, SOB, palpitations, numbness, weakness, dizziness, HA, vision changes     Drinking once a week about 6 beers    Unable to tolerate CPAP     Ophtho- 2/24  Dentist- every 6 months  Colonoscopy- 4/22- follow up 5 years  ISABEL- refused  PSA- 8/24  UA/Micro- 8/24  Lung CT- (less then 20 pack year history)  AAA- 5/21  EKG-  Pneumovax- 10/19  Prevnar- 5/17  Flu- refused  RSV-  Zoster- 4/19  Td- 7/22  Hep C- 10/18  Advance Directives- does not want at this time but wants daughter Silvina to make decisions  ETOH screen- 2/27/25  CV risk- 2/27/25  AWV- 2/27/25  MDD screen- 2/27/25    Patient Care Team:  Stefan Kaur DO as PCP - General  Melinda Srinivasan MD as Consulting Physician (Sleep Medicine)     Review of Systems   Constitutional:  Negative for chills, fatigue and fever.   HENT:  Negative for congestion, ear discharge, ear pain, hearing loss, nosebleeds, sore throat, tinnitus and trouble swallowing.    Eyes:  Negative for pain, redness and visual disturbance.   Respiratory:  Negative for cough, chest tightness, shortness of breath and wheezing.    Cardiovascular:  Negative for chest pain, palpitations and leg swelling.   Gastrointestinal:  Negative for abdominal pain, blood in stool, constipation, diarrhea, nausea and vomiting.   Endocrine: Negative for cold intolerance, heat intolerance, polydipsia, polyphagia and polyuria.   Genitourinary:  Negative for dysuria, frequency, hematuria and urgency.   Musculoskeletal:  Positive for  "arthralgias. Negative for back pain and gait problem.   Skin:  Negative for color change and rash.   Neurological:  Positive for numbness. Negative for dizziness, tremors, syncope, weakness and headaches.   Hematological:  Negative for adenopathy. Does not bruise/bleed easily.   Psychiatric/Behavioral:  Positive for dysphoric mood. The patient is nervous/anxious.        Objective   Vitals:  /82 (BP Location: Left arm, Patient Position: Sitting)   Pulse 71   Ht 1.765 m (5' 9.5\")   Wt 95.7 kg (211 lb)   SpO2 98%   BMI 30.71 kg/m²       Physical Exam  Vitals and nursing note reviewed.   Constitutional:       Appearance: Normal appearance.   HENT:      Head: Normocephalic and atraumatic.      Right Ear: Tympanic membrane, ear canal and external ear normal.      Left Ear: Tympanic membrane, ear canal and external ear normal.      Nose: Nose normal.      Mouth/Throat:      Mouth: Mucous membranes are dry.      Pharynx: Oropharynx is clear.   Eyes:      Extraocular Movements: Extraocular movements intact.      Conjunctiva/sclera: Conjunctivae normal.      Pupils: Pupils are equal, round, and reactive to light.   Cardiovascular:      Rate and Rhythm: Normal rate and regular rhythm.      Pulses: Normal pulses.      Heart sounds: Normal heart sounds.   Pulmonary:      Effort: Pulmonary effort is normal.      Breath sounds: Normal breath sounds.   Abdominal:      General: Abdomen is flat. Bowel sounds are normal.      Palpations: Abdomen is soft.   Musculoskeletal:      Cervical back: Normal range of motion and neck supple.   Skin:     Capillary Refill: Capillary refill takes less than 2 seconds.   Neurological:      General: No focal deficit present.      Mental Status: He is alert and oriented to person, place, and time.   Psychiatric:         Mood and Affect: Mood is depressed.         Behavior: Behavior normal.         Assessment & Plan  Routine general medical examination at health care facility         Primary " hypertension         Moderate episode of recurrent major depressive disorder         Alcohol dependence, binge pattern (Multi)         Cardiac risk counseling         Encounter for screening for other disorder         Hypercholesterolemia         Obstructive sleep apnea    Orders:    Referral to Adult Sleep Medicine; Future       Renewed/continued rest of medications  Check labs     HTN, controlled- continue meds, low sodium diet     Obesity- caloric restriction, increase CV exercise     Hyperlipidemia- continue meds, low fat/cholesterol diet     MDD- Paxil, Abilify, CV exercise, to counseling     KELLY- F/U sleep medicine to see if qualify for [facility]     ED- see urology, control BP and cholesterol     BPH- avoid caffeine, avoid fluids before bed     Alcohol Overuse- cut down to 1-2 a day    Alcohol Misuse Screen  5 - 10 minutes were spent screening the patient for alcohol misuse disorder.    Cardiac Risk Assessment  5 minutes were spent discussing Cardiovascular risk (21.4%) and, if needed, lifestyle modifications were recommended, including nutritional choices, exercise, and elimination of habits contributing to risk.   Aspirin use/disuse was discussed following the guidelines below:  low dose ASA ( mg) should be considered:    If prior Heart Attack/Stroke/Peripheral vascular disease:  Generally recommend daily low dose aspirin unless extremely high bleeding risk (e.g., gastrointestinal).    If no prior Heart Attack/Stroke/Peripheral vascular disease:              Age over 70: Do not use Aspirin for prevention    Age less than 70 and 10-year cardiovascular disease risk is >20%: use low dose Aspirin for prevention.

## 2025-02-27 NOTE — PATIENT INSTRUCTIONS
It is important to wear your sun block when you are going to spend more then a minute or two in the sun to reduce your risk of skin cancer    If you are a woman, then you should be doing a self breast exam once a month to feel for any lumps or bumps that do not resolve during the month. Call if you find this.    If you are a man, then you should be doing a self testicular exam once a month to feel for any lumps or bumps. Call if you find this.    You should get on average 150 minutes of cardiovascular exercise (such as brisk walking, running, biking, swimming, etc.) a week.  You should also limit food high in sodium, sugar and saturated/trans fats.  Eating lots of fruits and vegetables is good at helping lower cholesterol and blood pressure if prepared correctly    The age for colonoscopy is age 45-75 for average risk individuals    Prostate screen starts at age 50 and breast cancer screening is at age 40    Woman should get a pap smear between the ages of 21 and 65. The frequency depends on your age, the type of pap you had last and the result of the last pap.    Alcohol should be limited to 1 a day for women and 2 a day for men.    No amount of tobacco in any form is safe. It is recommended that no tobacco be used in any form.  Vapes are also not safe as there are multiple harmful chemicals in them and the heat can directly damage lung tissue.'

## 2025-03-07 ENCOUNTER — TELEPHONE (OUTPATIENT)
Dept: PRIMARY CARE | Facility: CLINIC | Age: 75
End: 2025-03-07
Payer: COMMERCIAL

## 2025-03-07 DIAGNOSIS — F33.1 MODERATE EPISODE OF RECURRENT MAJOR DEPRESSIVE DISORDER: Primary | ICD-10-CM

## 2025-03-07 RX ORDER — PAROXETINE HYDROCHLORIDE 40 MG/1
40 TABLET, FILM COATED ORAL EVERY MORNING
Qty: 30 TABLET | Refills: 5 | Status: SHIPPED | OUTPATIENT
Start: 2025-03-07 | End: 2025-09-03

## 2025-03-07 NOTE — TELEPHONE ENCOUNTER
PT would like you to call him about his medication.  Please let me know if you would like a phone appt.

## 2025-03-19 ENCOUNTER — TELEPHONE (OUTPATIENT)
Dept: PRIMARY CARE | Facility: CLINIC | Age: 75
End: 2025-03-19
Payer: COMMERCIAL

## 2025-03-19 DIAGNOSIS — F41.1 GENERALIZED ANXIETY DISORDER: Primary | ICD-10-CM

## 2025-03-19 RX ORDER — PROPRANOLOL HYDROCHLORIDE 20 MG/1
20 TABLET ORAL 2 TIMES DAILY
Qty: 60 TABLET | Refills: 5 | Status: SHIPPED | OUTPATIENT
Start: 2025-03-19 | End: 2025-03-21 | Stop reason: DRUGHIGH

## 2025-03-19 NOTE — TELEPHONE ENCOUNTER
Depression doing okay  Very anxious  Does not want to leave the house because anxious      Will add Propranolol 20 mg bid for the anxiety

## 2025-03-19 NOTE — TELEPHONE ENCOUNTER
Pt demanding to speak with you regarding his paxil its not working he's having a very hard time.  I tried, he wants to speak to you

## 2025-03-21 RX ORDER — PROPRANOLOL HYDROCHLORIDE 40 MG/1
40 TABLET ORAL 2 TIMES DAILY
Qty: 60 TABLET | Refills: 5 | Status: SHIPPED | OUTPATIENT
Start: 2025-03-21 | End: 2025-09-17

## 2025-03-28 DIAGNOSIS — F41.0 PANIC ATTACK: Primary | ICD-10-CM

## 2025-03-28 RX ORDER — LORAZEPAM 0.5 MG/1
0.5 TABLET ORAL 2 TIMES DAILY PRN
Qty: 14 TABLET | Refills: 0 | Status: SHIPPED | OUTPATIENT
Start: 2025-03-28 | End: 2025-04-04

## 2025-04-06 DIAGNOSIS — I10 ESSENTIAL (PRIMARY) HYPERTENSION: ICD-10-CM

## 2025-04-07 DIAGNOSIS — F33.1 MODERATE EPISODE OF RECURRENT MAJOR DEPRESSIVE DISORDER: Primary | ICD-10-CM

## 2025-04-07 RX ORDER — BUSPIRONE HYDROCHLORIDE 7.5 MG/1
7.5 TABLET ORAL 2 TIMES DAILY PRN
Qty: 60 TABLET | Refills: 0 | Status: SHIPPED | OUTPATIENT
Start: 2025-04-07 | End: 2025-04-10

## 2025-04-07 RX ORDER — LISINOPRIL AND HYDROCHLOROTHIAZIDE 10; 12.5 MG/1; MG/1
1 TABLET ORAL DAILY
Qty: 90 TABLET | Refills: 1 | Status: SHIPPED | OUTPATIENT
Start: 2025-04-07

## 2025-04-10 DIAGNOSIS — F33.1 MODERATE EPISODE OF RECURRENT MAJOR DEPRESSIVE DISORDER: Primary | ICD-10-CM

## 2025-04-10 RX ORDER — LORAZEPAM 1 MG/1
1 TABLET ORAL 2 TIMES DAILY PRN
Qty: 30 TABLET | Refills: 0 | Status: SHIPPED | OUTPATIENT
Start: 2025-04-10 | End: 2025-07-09

## 2025-04-27 DIAGNOSIS — F33.1 MODERATE EPISODE OF RECURRENT MAJOR DEPRESSIVE DISORDER: ICD-10-CM

## 2025-04-28 RX ORDER — LORAZEPAM 1 MG/1
1 TABLET ORAL 2 TIMES DAILY PRN
Qty: 60 TABLET | Refills: 0 | Status: SHIPPED | OUTPATIENT
Start: 2025-04-28

## 2025-05-08 DIAGNOSIS — F33.1 MODERATE EPISODE OF RECURRENT MAJOR DEPRESSIVE DISORDER: Primary | ICD-10-CM

## 2025-05-08 RX ORDER — PAROXETINE 10 MG/1
10 TABLET, FILM COATED ORAL EVERY MORNING
Qty: 30 TABLET | Refills: 5 | Status: SHIPPED | OUTPATIENT
Start: 2025-05-08 | End: 2025-11-04

## 2025-05-08 NOTE — PROGRESS NOTES
Subjective   Patient ID: Gerardo Santiago is a 75 y.o. male who presents for No chief complaint on file..  HPI    Review of Systems    Objective   Physical Exam    Assessment/Plan            Stefan Kaur DO 05/08/25 6:16 PM

## 2025-07-02 DIAGNOSIS — E78.00 PURE HYPERCHOLESTEROLEMIA, UNSPECIFIED: ICD-10-CM

## 2025-07-02 RX ORDER — SIMVASTATIN 40 MG/1
40 TABLET, FILM COATED ORAL DAILY
Qty: 90 TABLET | Refills: 1 | Status: SHIPPED | OUTPATIENT
Start: 2025-07-02

## 2025-07-26 DIAGNOSIS — F41.1 GENERALIZED ANXIETY DISORDER: ICD-10-CM

## 2025-07-28 RX ORDER — PROPRANOLOL HYDROCHLORIDE 40 MG/1
40 TABLET ORAL 2 TIMES DAILY
Qty: 180 TABLET | Refills: 1 | Status: SHIPPED | OUTPATIENT
Start: 2025-07-28

## 2025-08-02 DIAGNOSIS — F33.1 MODERATE EPISODE OF RECURRENT MAJOR DEPRESSIVE DISORDER: ICD-10-CM

## 2025-08-04 RX ORDER — PAROXETINE 40 MG/1
40 TABLET, FILM COATED ORAL EVERY MORNING
Qty: 90 TABLET | Refills: 1 | Status: SHIPPED | OUTPATIENT
Start: 2025-08-04 | End: 2026-01-31

## 2025-08-27 ENCOUNTER — APPOINTMENT (OUTPATIENT)
Dept: PRIMARY CARE | Facility: CLINIC | Age: 75
End: 2025-08-27
Payer: MEDICARE

## 2025-09-05 DIAGNOSIS — F33.1 MODERATE EPISODE OF RECURRENT MAJOR DEPRESSIVE DISORDER: ICD-10-CM

## 2025-09-05 RX ORDER — PAROXETINE 10 MG/1
10 TABLET, FILM COATED ORAL DAILY
Qty: 90 TABLET | Refills: 1 | Status: SHIPPED | OUTPATIENT
Start: 2025-09-05

## 2025-09-12 ENCOUNTER — APPOINTMENT (OUTPATIENT)
Dept: PRIMARY CARE | Facility: CLINIC | Age: 75
End: 2025-09-12
Payer: MEDICARE